# Patient Record
Sex: FEMALE | Race: BLACK OR AFRICAN AMERICAN | URBAN - METROPOLITAN AREA
[De-identification: names, ages, dates, MRNs, and addresses within clinical notes are randomized per-mention and may not be internally consistent; named-entity substitution may affect disease eponyms.]

---

## 2023-07-05 ENCOUNTER — OFFICE VISIT (OUTPATIENT)
Dept: OBGYN | Age: 28
End: 2023-07-05
Payer: COMMERCIAL

## 2023-07-05 ENCOUNTER — HOSPITAL ENCOUNTER (OUTPATIENT)
Age: 28
Setting detail: SPECIMEN
Discharge: HOME | End: 2023-07-05
Payer: COMMERCIAL

## 2023-07-05 VITALS
HEIGHT: 65 IN | WEIGHT: 192 LBS | BODY MASS INDEX: 31.99 KG/M2 | DIASTOLIC BLOOD PRESSURE: 73 MMHG | SYSTOLIC BLOOD PRESSURE: 119 MMHG

## 2023-07-05 DIAGNOSIS — N91.2 AMENORRHEA: ICD-10-CM

## 2023-07-05 DIAGNOSIS — Z01.419 WOMEN'S ANNUAL ROUTINE GYNECOLOGICAL EXAMINATION: Primary | ICD-10-CM

## 2023-07-05 DIAGNOSIS — E66.9 OBESITY (BMI 30.0-34.9): ICD-10-CM

## 2023-07-05 LAB
B-HCG SERPL EIA 3RD IS-ACNC: 41.7 MIU/ML
CONTROL: NORMAL
FSH SERPL-ACNC: 0.5 MIU/ML
PREGNANCY TEST URINE, POC: POSITIVE
PROLACTIN SERPL IA-MCNC: 13 NG/ML
TSH SERPL DL<=0.005 MIU/L-ACNC: 2.39 UIU/ML (ref 0.27–4.2)

## 2023-07-05 PROCEDURE — 99385 PREV VISIT NEW AGE 18-39: CPT

## 2023-07-05 PROCEDURE — 87801 DETECT AGNT MULT DNA AMPLI: CPT

## 2023-07-05 PROCEDURE — 36415 COLL VENOUS BLD VENIPUNCTURE: CPT

## 2023-07-05 PROCEDURE — 88142 CYTOPATH C/V THIN LAYER: CPT

## 2023-07-05 PROCEDURE — 81025 URINE PREGNANCY TEST: CPT

## 2023-07-05 SDOH — ECONOMIC STABILITY: FOOD INSECURITY: WITHIN THE PAST 12 MONTHS, THE FOOD YOU BOUGHT JUST DIDN'T LAST AND YOU DIDN'T HAVE MONEY TO GET MORE.: NEVER TRUE

## 2023-07-05 SDOH — ECONOMIC STABILITY: FOOD INSECURITY: WITHIN THE PAST 12 MONTHS, YOU WORRIED THAT YOUR FOOD WOULD RUN OUT BEFORE YOU GOT MONEY TO BUY MORE.: NEVER TRUE

## 2023-07-05 SDOH — ECONOMIC STABILITY: INCOME INSECURITY: HOW HARD IS IT FOR YOU TO PAY FOR THE VERY BASICS LIKE FOOD, HOUSING, MEDICAL CARE, AND HEATING?: NOT HARD AT ALL

## 2023-07-05 SDOH — ECONOMIC STABILITY: HOUSING INSECURITY
IN THE LAST 12 MONTHS, WAS THERE A TIME WHEN YOU DID NOT HAVE A STEADY PLACE TO SLEEP OR SLEPT IN A SHELTER (INCLUDING NOW)?: NO

## 2023-07-05 ASSESSMENT — PATIENT HEALTH QUESTIONNAIRE - PHQ9
SUM OF ALL RESPONSES TO PHQ QUESTIONS 1-9: 0
SUM OF ALL RESPONSES TO PHQ9 QUESTIONS 1 & 2: 0
SUM OF ALL RESPONSES TO PHQ QUESTIONS 1-9: 0
1. LITTLE INTEREST OR PLEASURE IN DOING THINGS: 0
2. FEELING DOWN, DEPRESSED OR HOPELESS: 0

## 2023-07-05 ASSESSMENT — ENCOUNTER SYMPTOMS
GASTROINTESTINAL NEGATIVE: 1
RESPIRATORY NEGATIVE: 1
ABDOMINAL PAIN: 0

## 2023-07-05 NOTE — PROGRESS NOTES
23    CynPolly Oniel  1995    Chief Complaint   Patient presents with    New Patient     Amenorrhea, LMP 3/3/2023, pregnancy test negative, is sexually active, never had pap smear. Pt denies any pelvic pain. The patient is a 32 y.o. female, Eliu Gallery who presents for her annual exam.  She is menstruating abnormally. She is  sexually active. She is not currently taking birth control    She reports additional symptoms of amenorrhea since 2023 . Pt states two menses prior to 2023 were abnormally heavy for her, has had no cramping or bleeding since then. She states she has taken home pregnancy tests that have been negative. Pt reports typically regular menses. Pap smear history: She has not had a PAP smear in the past.    Past Medical History:   Diagnosis Date    Irregular menses        Past Surgical History:   Procedure Laterality Date     SECTION         No family history on file. Social History     Tobacco Use    Smoking status: Never    Smokeless tobacco: Never   Vaping Use    Vaping Use: Never used   Substance Use Topics    Alcohol use: Never    Drug use: Never       No current outpatient medications on file. No current facility-administered medications for this visit. No Known Allergies          There is no immunization history on file for this patient. Review of Systems   Constitutional: Negative. Negative for fatigue and fever. Respiratory: Negative. Gastrointestinal: Negative. Negative for abdominal pain. Endocrine: Negative. Genitourinary:  Positive for menstrual problem. Negative for dysuria, pelvic pain, vaginal bleeding, vaginal discharge and vaginal pain. Musculoskeletal: Negative. Skin: Negative. Neurological: Negative. Negative for dizziness and headaches. Psychiatric/Behavioral: Negative.        /73 (Site: Left Upper Arm, Position: Sitting, Cuff Size: Large Adult)   Ht 5' 5\" (1.651 m)   Wt 192 lb (87.1 kg)

## 2023-07-07 LAB
SHBG SERPL-SCNC: 52 NMOL/L (ref 30–135)
TESTOST FREE SERPL-MCNC: 11.3 PG/ML (ref 0.8–7.4)
TESTOST SERPL-MCNC: 83 NG/DL (ref 20–70)

## 2023-07-08 LAB
C TRACH RRNA SPEC QL NAA+PROBE: NEGATIVE
N GONORRHOEA RRNA SPEC QL NAA+PROBE: NEGATIVE

## 2023-07-13 DIAGNOSIS — N94.89 OTHER SPECIFIED CONDITIONS ASSOCIATED WITH FEMALE GENITAL ORGANS AND MENSTRUAL CYCLE: Primary | ICD-10-CM

## 2023-07-19 ENCOUNTER — OFFICE VISIT (OUTPATIENT)
Dept: OBGYN | Age: 28
End: 2023-07-19
Payer: COMMERCIAL

## 2023-07-19 VITALS
DIASTOLIC BLOOD PRESSURE: 74 MMHG | WEIGHT: 192 LBS | SYSTOLIC BLOOD PRESSURE: 113 MMHG | HEIGHT: 65 IN | BODY MASS INDEX: 31.99 KG/M2

## 2023-07-19 DIAGNOSIS — O20.0 THREATENED ABORTION: Primary | ICD-10-CM

## 2023-07-19 DIAGNOSIS — N94.89 OTHER SPECIFIED CONDITIONS ASSOCIATED WITH FEMALE GENITAL ORGANS AND MENSTRUAL CYCLE: ICD-10-CM

## 2023-07-19 PROCEDURE — 99213 OFFICE O/P EST LOW 20 MIN: CPT | Performed by: OBSTETRICS & GYNECOLOGY

## 2023-07-19 PROCEDURE — 36415 COLL VENOUS BLD VENIPUNCTURE: CPT | Performed by: OBSTETRICS & GYNECOLOGY

## 2023-07-20 ENCOUNTER — OFFICE VISIT (OUTPATIENT)
Dept: OBGYN | Age: 28
End: 2023-07-20
Payer: COMMERCIAL

## 2023-07-20 VITALS
BODY MASS INDEX: 31.99 KG/M2 | HEIGHT: 65 IN | SYSTOLIC BLOOD PRESSURE: 143 MMHG | DIASTOLIC BLOOD PRESSURE: 84 MMHG | WEIGHT: 192 LBS

## 2023-07-20 DIAGNOSIS — O20.0 THREATENED ABORTION: Primary | ICD-10-CM

## 2023-07-20 LAB
B-HCG SERPL EIA 3RD IS-ACNC: 668.9 MIU/ML
DEPRECATED RDW RBC AUTO: 13.8 % (ref 12.4–15.4)
HCT VFR BLD AUTO: 37.2 % (ref 36–48)
HGB BLD-MCNC: 12.6 G/DL (ref 12–16)
MCH RBC QN AUTO: 29.8 PG (ref 26–34)
MCHC RBC AUTO-ENTMCNC: 33.8 G/DL (ref 31–36)
MCV RBC AUTO: 88.3 FL (ref 80–100)
PLATELET # BLD AUTO: 271 K/UL (ref 135–450)
PMV BLD AUTO: 8.9 FL (ref 5–10.5)
RBC # BLD AUTO: 4.21 M/UL (ref 4–5.2)
WBC # BLD AUTO: 6 K/UL (ref 4–11)

## 2023-07-20 PROCEDURE — 99214 OFFICE O/P EST MOD 30 MIN: CPT | Performed by: OBSTETRICS & GYNECOLOGY

## 2023-07-20 RX ORDER — CHOLECALCIFEROL (VITAMIN D3) 25 MCG
1 TABLET,CHEWABLE ORAL DAILY
Qty: 90 CAPSULE | Refills: 3 | Status: SHIPPED | OUTPATIENT
Start: 2023-07-20

## 2023-07-23 ENCOUNTER — APPOINTMENT (OUTPATIENT)
Dept: ULTRASOUND IMAGING | Age: 28
End: 2023-07-23
Payer: COMMERCIAL

## 2023-07-23 ENCOUNTER — HOSPITAL ENCOUNTER (OUTPATIENT)
Age: 28
Discharge: HOME | End: 2023-07-23
Attending: OBSTETRICS & GYNECOLOGY | Admitting: OBSTETRICS & GYNECOLOGY
Payer: COMMERCIAL

## 2023-07-23 VITALS
OXYGEN SATURATION: 100 % | RESPIRATION RATE: 17 BRPM | DIASTOLIC BLOOD PRESSURE: 59 MMHG | HEART RATE: 73 BPM | TEMPERATURE: 99 F | SYSTOLIC BLOOD PRESSURE: 105 MMHG

## 2023-07-23 LAB — GONADOTROPIN, CHORIONIC (HCG) QUANT: 369.8 UIU/ML

## 2023-07-23 PROCEDURE — 84702 CHORIONIC GONADOTROPIN TEST: CPT

## 2023-07-23 PROCEDURE — 93975 VASCULAR STUDY: CPT

## 2023-07-23 PROCEDURE — 99214 OFFICE O/P EST MOD 30 MIN: CPT

## 2023-07-23 PROCEDURE — 99213 OFFICE O/P EST LOW 20 MIN: CPT

## 2023-07-23 PROCEDURE — 76817 TRANSVAGINAL US OBSTETRIC: CPT

## 2023-07-23 NOTE — FLOWSHEET NOTE
Pt states she has been bleeding for about 1 week, today the bleeding has increased (using 2-3 pads a day) and has lower abdominal cramping. Pt states she had an U/S a couple days ago (07/19) and was told she was about 1 month pregnant. Pt states her LMP was May 3. Blood drawn for HCG, sent to lab.

## 2023-07-23 NOTE — FLOWSHEET NOTE
Pt to labor and delivery via wheelchair, pt to triage 4, pt to bathroom to obtain urine spec and change into gown.

## 2023-07-25 ENCOUNTER — OFFICE VISIT (OUTPATIENT)
Dept: OBGYN | Age: 28
End: 2023-07-25
Payer: COMMERCIAL

## 2023-07-25 VITALS
BODY MASS INDEX: 31.65 KG/M2 | WEIGHT: 190 LBS | SYSTOLIC BLOOD PRESSURE: 125 MMHG | DIASTOLIC BLOOD PRESSURE: 79 MMHG | HEIGHT: 65 IN

## 2023-07-25 DIAGNOSIS — O20.0 THREATENED ABORTION: Primary | ICD-10-CM

## 2023-07-25 PROCEDURE — 99213 OFFICE O/P EST LOW 20 MIN: CPT | Performed by: OBSTETRICS & GYNECOLOGY

## 2023-07-25 PROCEDURE — 36415 COLL VENOUS BLD VENIPUNCTURE: CPT | Performed by: OBSTETRICS & GYNECOLOGY

## 2023-07-25 NOTE — PROGRESS NOTES
23    CynPollymaxine Engle  1995    Chief Complaint   Patient presents with    Follow-up     Pt here for mab follow up, ultrasound today, pt having heavy bleeding bright red -dark. Denies cramping and pain. Opal Hendrix is a 32 y.o. female who presents today for evaluation of thretened     Past Medical History:   Diagnosis Date    Irregular menses        Past Surgical History:   Procedure Laterality Date     SECTION         Social History     Tobacco Use    Smoking status: Never    Smokeless tobacco: Never   Vaping Use    Vaping Use: Never used   Substance Use Topics    Alcohol use: Never    Drug use: Never       No family history on file. Current Outpatient Medications   Medication Sig Dispense Refill    Prenatal Vit-Fe Sulfate-FA-DHA (PRENATAL VITAMIN/MIN +DHA) 27-0.8-200 MG CAPS Take 1 tablet by mouth daily (may substitute any prenatal vitamin covered by insurance, ok for prenatal without DHA if DHA based prenatal is not covered) 90 capsule 3     No current facility-administered medications for this visit.        No Known Allergies        2023 1310 2023 1341 hCG, quantitative, pregnancy [[id number]]   Blood    Component Value Units   hCG Quant 369.8  uIU/ML          2023 1510 2023 0044 HCG, Quantitative, Pregnancy [[id number]]   Blood    Component Value Units   hCG Quant 668.9  mIU/mL          2023 1510 2023 0021 CBC [[id number]]   Blood    Component Value Units   WBC 6.0 K/uL   RBC 4.21 M/uL   Hemoglobin 12.6 g/dL   Hematocrit 37.2 %   MCV 88.3 fL   MCH 29.8 pg   MCHC 33.8 g/dL   RDW 13.8 %   Platelets 782 K/uL   MPV 8.9 fL          2023 1359 2023 2037 HCG, Quantitative, Pregnancy [[id number]]   Blood    Component Value Units   hCG Quant 41.7  mIU/mL              Review of Systems    /79 (Site: Right Upper Arm, Position: Sitting, Cuff Size: Large Adult)   Ht 5' 5\" (1.651 m)   Wt 190 lb (86.2 kg)   LMP 2023

## 2023-07-26 ENCOUNTER — HOSPITAL ENCOUNTER (OUTPATIENT)
Age: 28
Discharge: HOME | End: 2023-07-26
Attending: OBSTETRICS & GYNECOLOGY | Admitting: OBSTETRICS & GYNECOLOGY
Payer: COMMERCIAL

## 2023-07-26 VITALS
TEMPERATURE: 96.9 F | SYSTOLIC BLOOD PRESSURE: 124 MMHG | HEART RATE: 68 BPM | DIASTOLIC BLOOD PRESSURE: 71 MMHG | RESPIRATION RATE: 16 BRPM | OXYGEN SATURATION: 97 %

## 2023-07-26 DIAGNOSIS — O20.0 THREATENED ABORTION: Primary | ICD-10-CM

## 2023-07-26 LAB
ABO + RH BLD: NORMAL
ALBUMIN SERPL-MCNC: 4.5 GM/DL (ref 3.4–5)
ALP BLD-CCNC: 57 IU/L (ref 40–128)
ALT SERPL-CCNC: 24 U/L (ref 10–40)
ANION GAP SERPL CALCULATED.3IONS-SCNC: 9 MMOL/L (ref 4–16)
AST SERPL-CCNC: 21 IU/L (ref 15–37)
B-HCG SERPL EIA 3RD IS-ACNC: 421.2 MIU/ML
BASOPHILS ABSOLUTE: 0 K/CU MM
BASOPHILS RELATIVE PERCENT: 0.2 % (ref 0–1)
BILIRUB SERPL-MCNC: 0.2 MG/DL (ref 0–1)
BUN SERPL-MCNC: 6 MG/DL (ref 6–23)
CALCIUM SERPL-MCNC: 9.1 MG/DL (ref 8.3–10.6)
CHLORIDE BLD-SCNC: 102 MMOL/L (ref 99–110)
CO2: 25 MMOL/L (ref 21–32)
CREAT SERPL-MCNC: 0.7 MG/DL (ref 0.6–1.1)
DEPRECATED RDW RBC AUTO: 13.8 % (ref 12.4–15.4)
DIFFERENTIAL TYPE: ABNORMAL
EOSINOPHILS ABSOLUTE: 0.1 K/CU MM
EOSINOPHILS RELATIVE PERCENT: 0.9 % (ref 0–3)
GFR SERPL CREATININE-BSD FRML MDRD: >60 ML/MIN/1.73M2
GLUCOSE SERPL-MCNC: 108 MG/DL (ref 70–99)
GONADOTROPIN, CHORIONIC (HCG) QUANT: 377.8 UIU/ML
HCT VFR BLD AUTO: 39 % (ref 36–48)
HCT VFR BLD CALC: 38.7 % (ref 37–47)
HEMOGLOBIN: 12.1 GM/DL (ref 12.5–16)
HGB BLD-MCNC: 13.3 G/DL (ref 12–16)
IMMATURE NEUTROPHIL %: 0.2 % (ref 0–0.43)
LYMPHOCYTES ABSOLUTE: 2.3 K/CU MM
LYMPHOCYTES RELATIVE PERCENT: 35.2 % (ref 24–44)
MCH RBC QN AUTO: 28.8 PG (ref 27–31)
MCH RBC QN AUTO: 30.2 PG (ref 26–34)
MCHC RBC AUTO-ENTMCNC: 31.3 % (ref 32–36)
MCHC RBC AUTO-ENTMCNC: 34 G/DL (ref 31–36)
MCV RBC AUTO: 88.8 FL (ref 80–100)
MCV RBC AUTO: 92.1 FL (ref 78–100)
MONOCYTES ABSOLUTE: 0.4 K/CU MM
MONOCYTES RELATIVE PERCENT: 6.3 % (ref 0–4)
NUCLEATED RBC %: 0 %
PDW BLD-RTO: 13 % (ref 11.7–14.9)
PLATELET # BLD AUTO: 246 K/UL (ref 135–450)
PLATELET # BLD: 244 K/CU MM (ref 140–440)
PMV BLD AUTO: 8.7 FL (ref 5–10.5)
PMV BLD AUTO: 9.9 FL (ref 7.5–11.1)
POTASSIUM SERPL-SCNC: 4.1 MMOL/L (ref 3.5–5.1)
RBC # BLD AUTO: 4.39 M/UL (ref 4–5.2)
RBC # BLD: 4.2 M/CU MM (ref 4.2–5.4)
SEGMENTED NEUTROPHILS ABSOLUTE COUNT: 3.7 K/CU MM
SEGMENTED NEUTROPHILS RELATIVE PERCENT: 57.2 % (ref 36–66)
SODIUM BLD-SCNC: 136 MMOL/L (ref 135–145)
TOTAL IMMATURE NEUTOROPHIL: 0.01 K/CU MM
TOTAL NUCLEATED RBC: 0 K/CU MM
TOTAL PROTEIN: 7.3 GM/DL (ref 6.4–8.2)
WBC # BLD AUTO: 5.2 K/UL (ref 4–11)
WBC # BLD: 6.5 K/CU MM (ref 4–10.5)

## 2023-07-26 PROCEDURE — 99203 OFFICE O/P NEW LOW 30 MIN: CPT

## 2023-07-26 PROCEDURE — 84702 CHORIONIC GONADOTROPIN TEST: CPT

## 2023-07-26 PROCEDURE — 99214 OFFICE O/P EST MOD 30 MIN: CPT | Performed by: OBSTETRICS & GYNECOLOGY

## 2023-07-26 PROCEDURE — 80053 COMPREHEN METABOLIC PANEL: CPT

## 2023-07-26 PROCEDURE — 85025 COMPLETE CBC W/AUTO DIFF WBC: CPT

## 2023-07-26 NOTE — FLOWSHEET NOTE
Pt presents amb to unit lab work. POC discussed. Interpretor services used. Pt states as still having some bleeding unable to give amount but states has changed pad 2 times today. Dr. Gibbs Keep notified of pt arrival. Orders received.

## 2023-07-27 NOTE — PROGRESS NOTES
Dean Engle  1995  Primary Care Physician: No primary care provider on file. HISTORY & PHYSICAL        HOSPITAL: [facility]    HPI: Mandeep French is a 32 y.o. female [id number] who is being followed for abnormal first trimester pregnancy who had decreasing hcgs and then her hcg increased. She reports light bleeding. She denies any pain. No diagnosis found. Patient Active Problem List   Diagnosis    Labor and delivery indication for care or intervention       OB History    Para Term  AB Living   3 2 2 0 1 2   SAB IAB Ectopic Molar Multiple Live Births   1 0 0 0 0 2      # Outcome Date GA Lbr Aniket/2nd Weight Sex Delivery Anes PTL Lv   3 Term 22    F CS-LTranv   ROBBIE   2 Term 10/03/19    M CS-LTranv   ROBBIE   1 SAB              Past Medical History:   Diagnosis Date    Irregular menses        Past Surgical History:   Procedure Laterality Date     SECTION         Social History     Socioeconomic History    Marital status: Unknown     Spouse name: Not on file    Number of children: Not on file    Years of education: Not on file    Highest education level: Not on file   Occupational History    Not on file   Tobacco Use    Smoking status: Never    Smokeless tobacco: Never   Vaping Use    Vaping Use: Never used   Substance and Sexual Activity    Alcohol use: Never    Drug use: Never    Sexual activity: Yes     Partners: Male   Other Topics Concern    Not on file   Social History Narrative    Not on file     Social Determinants of Health     Financial Resource Strain: Low Risk     Difficulty of Paying Living Expenses: Not hard at all   Food Insecurity: No Food Insecurity    Worried About Running Out of Food in the Last Year: Never true    [address] in the Last Year: Never true   Transportation Needs: Unknown    Lack of Transportation (Medical): Not on file    Lack of Transportation (Non-Medical):  No   Physical Activity: Not on file   Stress: Not on file   Social

## 2023-07-31 ENCOUNTER — HOSPITAL ENCOUNTER (OUTPATIENT)
Age: 28
Discharge: HOME | End: 2023-07-31
Payer: COMMERCIAL

## 2023-07-31 LAB
GONADOTROPIN, CHORIONIC (HCG) QUANT: 277.5 UIU/ML
HCT VFR BLD CALC: 38.9 % (ref 37–47)
HEMOGLOBIN: 12.4 GM/DL (ref 12.5–16)
MCH RBC QN AUTO: 28.8 PG (ref 27–31)
MCHC RBC AUTO-ENTMCNC: 31.9 % (ref 32–36)
MCV RBC AUTO: 90.5 FL (ref 78–100)
PDW BLD-RTO: 12.9 % (ref 11.7–14.9)
PLATELET # BLD: 254 K/CU MM (ref 140–440)
PMV BLD AUTO: 9.6 FL (ref 7.5–11.1)
RBC # BLD: 4.3 M/CU MM (ref 4.2–5.4)
WBC # BLD: 5.6 K/CU MM (ref 4–10.5)

## 2023-07-31 PROCEDURE — 36415 COLL VENOUS BLD VENIPUNCTURE: CPT

## 2023-07-31 PROCEDURE — 85027 COMPLETE CBC AUTOMATED: CPT

## 2023-07-31 PROCEDURE — 84702 CHORIONIC GONADOTROPIN TEST: CPT

## 2023-08-01 ENCOUNTER — OFFICE VISIT (OUTPATIENT)
Dept: OBGYN | Age: 28
End: 2023-08-01
Payer: COMMERCIAL

## 2023-08-01 VITALS
SYSTOLIC BLOOD PRESSURE: 111 MMHG | BODY MASS INDEX: 31.65 KG/M2 | HEIGHT: 65 IN | DIASTOLIC BLOOD PRESSURE: 72 MMHG | WEIGHT: 190 LBS

## 2023-08-01 DIAGNOSIS — O20.0 THREATENED ABORTION: Primary | ICD-10-CM

## 2023-08-01 PROCEDURE — 99213 OFFICE O/P EST LOW 20 MIN: CPT | Performed by: OBSTETRICS & GYNECOLOGY

## 2023-08-07 ENCOUNTER — NURSE ONLY (OUTPATIENT)
Dept: OBGYN | Age: 28
End: 2023-08-07
Payer: COMMERCIAL

## 2023-08-07 DIAGNOSIS — O20.0 THREATENED ABORTION: ICD-10-CM

## 2023-08-07 LAB — B-HCG SERPL EIA 3RD IS-ACNC: 256.1 MIU/ML

## 2023-08-07 PROCEDURE — 36415 COLL VENOUS BLD VENIPUNCTURE: CPT | Performed by: OBSTETRICS & GYNECOLOGY

## 2023-08-09 DIAGNOSIS — O20.0 THREATENED ABORTION: Primary | ICD-10-CM

## 2023-08-10 ENCOUNTER — HOSPITAL ENCOUNTER (OUTPATIENT)
Age: 28
Discharge: HOME | End: 2023-08-10
Attending: OBSTETRICS & GYNECOLOGY | Admitting: OBSTETRICS & GYNECOLOGY
Payer: COMMERCIAL

## 2023-08-10 LAB
ALBUMIN SERPL-MCNC: 4.5 GM/DL (ref 3.4–5)
ALP BLD-CCNC: 58 IU/L (ref 40–129)
ALT SERPL-CCNC: 23 U/L (ref 10–40)
ANION GAP SERPL CALCULATED.3IONS-SCNC: 11 MMOL/L (ref 4–16)
AST SERPL-CCNC: 21 IU/L (ref 15–37)
BASOPHILS ABSOLUTE: 0 K/CU MM
BASOPHILS RELATIVE PERCENT: 0 % (ref 0–1)
BILIRUB SERPL-MCNC: 0.1 MG/DL (ref 0–1)
BUN SERPL-MCNC: 8 MG/DL (ref 6–23)
CALCIUM SERPL-MCNC: 9.2 MG/DL (ref 8.3–10.6)
CHLORIDE BLD-SCNC: 103 MMOL/L (ref 99–110)
CO2: 24 MMOL/L (ref 21–32)
CREAT SERPL-MCNC: 0.7 MG/DL (ref 0.6–1.1)
DIFFERENTIAL TYPE: ABNORMAL
EOSINOPHILS ABSOLUTE: 0.1 K/CU MM
EOSINOPHILS RELATIVE PERCENT: 1.2 % (ref 0–3)
GFR SERPL CREATININE-BSD FRML MDRD: >60 ML/MIN/1.73M2
GLUCOSE SERPL-MCNC: 91 MG/DL (ref 70–99)
GONADOTROPIN, CHORIONIC (HCG) QUANT: 145.6 UIU/ML
HCT VFR BLD CALC: 37.9 % (ref 37–47)
HEMOGLOBIN: 11.9 GM/DL (ref 12.5–16)
IMMATURE NEUTROPHIL %: 0.4 % (ref 0–0.43)
LYMPHOCYTES ABSOLUTE: 2.3 K/CU MM
LYMPHOCYTES RELATIVE PERCENT: 39.6 % (ref 24–44)
MCH RBC QN AUTO: 28.9 PG (ref 27–31)
MCHC RBC AUTO-ENTMCNC: 31.4 % (ref 32–36)
MCV RBC AUTO: 92 FL (ref 78–100)
MONOCYTES ABSOLUTE: 0.4 K/CU MM
MONOCYTES RELATIVE PERCENT: 6.2 % (ref 0–4)
NUCLEATED RBC %: 0 %
PDW BLD-RTO: 12.7 % (ref 11.7–14.9)
PLATELET # BLD: 237 K/CU MM (ref 140–440)
PMV BLD AUTO: 10 FL (ref 7.5–11.1)
POTASSIUM SERPL-SCNC: 4.3 MMOL/L (ref 3.5–5.1)
RBC # BLD: 4.12 M/CU MM (ref 4.2–5.4)
SEGMENTED NEUTROPHILS ABSOLUTE COUNT: 3 K/CU MM
SEGMENTED NEUTROPHILS RELATIVE PERCENT: 52.6 % (ref 36–66)
SODIUM BLD-SCNC: 138 MMOL/L (ref 135–145)
TOTAL IMMATURE NEUTOROPHIL: 0.02 K/CU MM
TOTAL NUCLEATED RBC: 0 K/CU MM
TOTAL PROTEIN: 6.8 GM/DL (ref 6.4–8.2)
WBC # BLD: 5.7 K/CU MM (ref 4–10.5)

## 2023-08-10 PROCEDURE — 80053 COMPREHEN METABOLIC PANEL: CPT

## 2023-08-10 PROCEDURE — 85025 COMPLETE CBC W/AUTO DIFF WBC: CPT

## 2023-08-10 PROCEDURE — 84702 CHORIONIC GONADOTROPIN TEST: CPT

## 2023-08-10 PROCEDURE — 99211 OFF/OP EST MAY X REQ PHY/QHP: CPT

## 2023-08-10 NOTE — FLOWSHEET NOTE
Patient here for repeat hcg. Labs drawn without issue. Patient signs discharge instructions and ambulates off unit without signs of distress.

## 2023-08-15 ENCOUNTER — OFFICE VISIT (OUTPATIENT)
Dept: OBGYN | Age: 28
End: 2023-08-15
Payer: COMMERCIAL

## 2023-08-15 VITALS
DIASTOLIC BLOOD PRESSURE: 70 MMHG | BODY MASS INDEX: 31.99 KG/M2 | WEIGHT: 192 LBS | SYSTOLIC BLOOD PRESSURE: 116 MMHG | HEIGHT: 65 IN

## 2023-08-15 DIAGNOSIS — O20.0 THREATENED ABORTION: Primary | ICD-10-CM

## 2023-08-15 LAB — B-HCG SERPL EIA 3RD IS-ACNC: 12.2 MIU/ML

## 2023-08-15 PROCEDURE — 99213 OFFICE O/P EST LOW 20 MIN: CPT | Performed by: OBSTETRICS & GYNECOLOGY

## 2023-08-15 PROCEDURE — 36415 COLL VENOUS BLD VENIPUNCTURE: CPT | Performed by: OBSTETRICS & GYNECOLOGY

## 2024-01-20 ENCOUNTER — HOSPITAL ENCOUNTER (EMERGENCY)
Age: 29
Discharge: HOME | End: 2024-01-20
Payer: COMMERCIAL

## 2024-01-20 VITALS
SYSTOLIC BLOOD PRESSURE: 135 MMHG | DIASTOLIC BLOOD PRESSURE: 87 MMHG | OXYGEN SATURATION: 100 % | HEART RATE: 93 BPM | TEMPERATURE: 98.6 F | RESPIRATION RATE: 16 BRPM

## 2024-01-20 DIAGNOSIS — K02.9 DENTAL DECAY: ICD-10-CM

## 2024-01-20 DIAGNOSIS — K08.89 PAIN, DENTAL: Primary | ICD-10-CM

## 2024-01-20 PROCEDURE — 6370000000 HC RX 637 (ALT 250 FOR IP): Performed by: NURSE PRACTITIONER

## 2024-01-20 PROCEDURE — 99283 EMERGENCY DEPT VISIT LOW MDM: CPT

## 2024-01-20 RX ORDER — ACETAMINOPHEN 325 MG/1
650 TABLET ORAL EVERY 6 HOURS PRN
Qty: 40 TABLET | Refills: 0 | Status: SHIPPED | OUTPATIENT
Start: 2024-01-20

## 2024-01-20 RX ORDER — PENICILLIN V POTASSIUM 500 MG/1
500 TABLET ORAL 4 TIMES DAILY
Qty: 40 TABLET | Refills: 0 | Status: SHIPPED | OUTPATIENT
Start: 2024-01-20 | End: 2024-01-30

## 2024-01-20 RX ORDER — PENICILLIN V POTASSIUM 500 MG/1
500 TABLET ORAL ONCE
Status: COMPLETED | OUTPATIENT
Start: 2024-01-20 | End: 2024-01-20

## 2024-01-20 RX ORDER — ACETAMINOPHEN 325 MG/1
650 TABLET ORAL ONCE
Status: COMPLETED | OUTPATIENT
Start: 2024-01-20 | End: 2024-01-20

## 2024-01-20 RX ADMIN — ACETAMINOPHEN 650 MG: 325 TABLET ORAL at 16:25

## 2024-01-20 RX ADMIN — PENICILLIN V POTASSIUM 500 MG: 500 TABLET, FILM COATED ORAL at 16:25

## 2024-01-20 ASSESSMENT — PAIN DESCRIPTION - LOCATION: LOCATION: MOUTH

## 2024-01-20 ASSESSMENT — ENCOUNTER SYMPTOMS
CHEST TIGHTNESS: 0
FACIAL SWELLING: 0
TROUBLE SWALLOWING: 0
SORE THROAT: 1
VOICE CHANGE: 0
SHORTNESS OF BREATH: 0

## 2024-01-20 ASSESSMENT — PAIN SCALES - GENERAL: PAINLEVEL_OUTOF10: 10

## 2024-01-20 NOTE — ED PROVIDER NOTES
[facility] EMERGENCY DEPARTMENT  EMERGENCY DEPARTMENT ENCOUNTER      Pt Name: Dean Engle  MRN: [id number]  Birthdate 1995  Date of evaluation: 2024  Provider: TATUM Hilliard - CNP  PCP: No primary care provider on file.  Note Started: 3:49 PM EST 24    I am the Primary Clinician of Record.  ANDER. I have evaluated this patient.    CHIEF COMPLAINT       Chief Complaint   Patient presents with    Dental Pain       HISTORY OF PRESENT ILLNESS: 1 or more Elements   Dean Egnle is a 28 y.o. female who presents to the ER with chief complaint of who presents with dental pain. Patient states that the pain began 3 days ago. This has been intermittent for several years. It is localized to the left upper jaw. It is aggravated by palpation and chewing, alleviated by nothing, with no radiation or migration. Denies any swelling, drainage, fevers, chills. Patient does not have a dentist.   She reports to be 4 months pregnant.    Information obtained from patient via professional interpretation services as the patient is non-english speaking     I have reviewed the nursing triage documentation and agree unless otherwise noted.  REVIEW OF SYSTEMS :    Review of Systems   Constitutional:  Negative for fatigue and fever.   HENT:  Positive for dental problem and sore throat. Negative for facial swelling, trouble swallowing and voice change.    Respiratory:  Negative for chest tightness and shortness of breath.    Cardiovascular:  Negative for chest pain.     Positives and Pertinent negatives as per HPI.   SURGICAL HISTORY     Past Surgical History:   Procedure Laterality Date     SECTION         CURRENTMEDICATIONS       Previous Medications    PRENATAL VIT-FE SULFATE-FA-DHA (PRENATAL VITAMIN/MIN +DHA) 27-0.8-200 MG CAPS    Take 1 tablet by mouth daily (may substitute any prenatal vitamin covered by insurance, ok for prenatal without DHA if DHA

## 2024-01-28 ENCOUNTER — HOSPITAL ENCOUNTER (EMERGENCY)
Age: 29
Discharge: HOME | End: 2024-01-29
Attending: EMERGENCY MEDICINE
Payer: COMMERCIAL

## 2024-01-28 ENCOUNTER — APPOINTMENT (OUTPATIENT)
Dept: GENERAL RADIOLOGY | Age: 29
End: 2024-01-28
Payer: COMMERCIAL

## 2024-01-28 ENCOUNTER — APPOINTMENT (OUTPATIENT)
Dept: GENERAL RADIOLOGY | Age: 29
End: 2024-01-28
Attending: EMERGENCY MEDICINE
Payer: COMMERCIAL

## 2024-01-28 DIAGNOSIS — R06.02 SHORTNESS OF BREATH: Primary | ICD-10-CM

## 2024-01-28 DIAGNOSIS — Z3A.16 16 WEEKS GESTATION OF PREGNANCY: ICD-10-CM

## 2024-01-28 LAB
ALBUMIN SERPL-MCNC: 4.2 GM/DL (ref 3.4–5)
ALP BLD-CCNC: 70 IU/L (ref 40–129)
ALT SERPL-CCNC: 30 U/L (ref 10–40)
ANION GAP SERPL CALCULATED.3IONS-SCNC: 12 MMOL/L (ref 7–16)
AST SERPL-CCNC: 36 IU/L (ref 15–37)
BASOPHILS ABSOLUTE: 0 K/CU MM
BASOPHILS RELATIVE PERCENT: 0.2 % (ref 0–1)
BILIRUB SERPL-MCNC: 0.1 MG/DL (ref 0–1)
BUN SERPL-MCNC: 6 MG/DL (ref 6–23)
CALCIUM SERPL-MCNC: 8.8 MG/DL (ref 8.3–10.6)
CHLORIDE BLD-SCNC: 101 MMOL/L (ref 99–110)
CO2: 21 MMOL/L (ref 21–32)
CREAT SERPL-MCNC: 0.5 MG/DL (ref 0.6–1.1)
DIFFERENTIAL TYPE: ABNORMAL
EOSINOPHILS ABSOLUTE: 0.1 K/CU MM
EOSINOPHILS RELATIVE PERCENT: 0.9 % (ref 0–3)
GFR SERPL CREATININE-BSD FRML MDRD: >60 ML/MIN/1.73M2
GLUCOSE SERPL-MCNC: 102 MG/DL (ref 70–99)
HCG QUALITATIVE: POSITIVE
HCT VFR BLD CALC: 43.1 % (ref 37–47)
HEMOGLOBIN: 14.3 GM/DL (ref 12.5–16)
IMMATURE NEUTROPHIL %: 0.2 % (ref 0–0.43)
INFLUENZA A ANTIGEN: NOT DETECTED
INFLUENZA B ANTIGEN: NOT DETECTED
LYMPHOCYTES ABSOLUTE: 2.2 K/CU MM
LYMPHOCYTES RELATIVE PERCENT: 32.9 % (ref 24–44)
MAGNESIUM: 2.5 MG/DL (ref 1.8–2.4)
MCH RBC QN AUTO: 29.6 PG (ref 27–31)
MCHC RBC AUTO-ENTMCNC: 33.2 % (ref 32–36)
MCV RBC AUTO: 89.2 FL (ref 78–100)
MONOCYTES ABSOLUTE: 0.3 K/CU MM
MONOCYTES RELATIVE PERCENT: 4.9 % (ref 0–4)
NUCLEATED RBC %: 0 %
PDW BLD-RTO: 12.5 % (ref 11.7–14.9)
PLATELET # BLD: 234 K/CU MM (ref 140–440)
PMV BLD AUTO: 10.1 FL (ref 7.5–11.1)
POTASSIUM SERPL-SCNC: 3.9 MMOL/L (ref 3.5–5.1)
PRO-BNP: <36 PG/ML
RBC # BLD: 4.83 M/CU MM (ref 4.2–5.4)
SARS-COV-2 RDRP RESP QL NAA+PROBE: NOT DETECTED
SEGMENTED NEUTROPHILS ABSOLUTE COUNT: 4 K/CU MM
SEGMENTED NEUTROPHILS RELATIVE PERCENT: 60.9 % (ref 36–66)
SODIUM BLD-SCNC: 134 MMOL/L (ref 135–145)
SOURCE: NORMAL
TOTAL IMMATURE NEUTOROPHIL: 0.01 K/CU MM
TOTAL NUCLEATED RBC: 0 K/CU MM
TOTAL PROTEIN: 7.6 GM/DL (ref 6.4–8.2)
TROPONIN, HIGH SENSITIVITY: <6 NG/L (ref 0–14)
WBC # BLD: 6.5 K/CU MM (ref 4–10.5)

## 2024-01-28 PROCEDURE — 83880 ASSAY OF NATRIURETIC PEPTIDE: CPT

## 2024-01-28 PROCEDURE — 84484 ASSAY OF TROPONIN QUANT: CPT

## 2024-01-28 PROCEDURE — 87635 SARS-COV-2 COVID-19 AMP PRB: CPT

## 2024-01-28 PROCEDURE — 71045 X-RAY EXAM CHEST 1 VIEW: CPT

## 2024-01-28 PROCEDURE — 83735 ASSAY OF MAGNESIUM: CPT

## 2024-01-28 PROCEDURE — 85379 FIBRIN DEGRADATION QUANT: CPT

## 2024-01-28 PROCEDURE — 87502 INFLUENZA DNA AMP PROBE: CPT

## 2024-01-28 PROCEDURE — 99285 EMERGENCY DEPT VISIT HI MDM: CPT

## 2024-01-28 PROCEDURE — 93005 ELECTROCARDIOGRAM TRACING: CPT | Performed by: EMERGENCY MEDICINE

## 2024-01-28 PROCEDURE — 84703 CHORIONIC GONADOTROPIN ASSAY: CPT

## 2024-01-28 PROCEDURE — 80053 COMPREHEN METABOLIC PANEL: CPT

## 2024-01-28 PROCEDURE — 85025 COMPLETE CBC W/AUTO DIFF WBC: CPT

## 2024-01-29 ENCOUNTER — APPOINTMENT (OUTPATIENT)
Dept: CT IMAGING | Age: 29
End: 2024-01-29
Payer: COMMERCIAL

## 2024-01-29 VITALS
TEMPERATURE: 98.2 F | HEART RATE: 72 BPM | DIASTOLIC BLOOD PRESSURE: 78 MMHG | SYSTOLIC BLOOD PRESSURE: 111 MMHG | OXYGEN SATURATION: 99 % | RESPIRATION RATE: 26 BRPM

## 2024-01-29 LAB
D DIMER: 3.67 UG/ML (FEU)
EKG ATRIAL RATE: 77 BPM
EKG DIAGNOSIS: NORMAL
EKG P AXIS: 26 DEGREES
EKG P-R INTERVAL: 136 MS
EKG Q-T INTERVAL: 382 MS
EKG QRS DURATION: 76 MS
EKG QTC CALCULATION (BAZETT): 432 MS
EKG R AXIS: 8 DEGREES
EKG T AXIS: 10 DEGREES
EKG VENTRICULAR RATE: 77 BPM

## 2024-01-29 PROCEDURE — 71275 CT ANGIOGRAPHY CHEST: CPT

## 2024-01-29 PROCEDURE — 6360000004 HC RX CONTRAST MEDICATION: Performed by: STUDENT IN AN ORGANIZED HEALTH CARE EDUCATION/TRAINING PROGRAM

## 2024-01-29 PROCEDURE — 93010 ELECTROCARDIOGRAM REPORT: CPT | Performed by: INTERNAL MEDICINE

## 2024-01-29 RX ADMIN — IOPAMIDOL 75 ML: 755 INJECTION, SOLUTION INTRAVENOUS at 01:21

## 2024-01-29 NOTE — ED PROVIDER NOTES
Emergency Department Encounter  Location: [facility] EMERGENCY DEPARTMENT    Patient: Dean Engle  MRN: [id number]  : 1995  Date of evaluation: 2024  ED Provider: Edwin Posadas, DO    :1a.m.  Dean Engle was checked out to me by Frank. Please see his/her initial documentation for details of the patient's initial ED presentation, physical exam and completed studies.    In brief, Dean Engle is a 28 y.o. female that presented to the emergency department shortness of breath.  Currently pregnant.    I have reviewed and interpreted all of the currently available lab results and diagnostics from this visit:  Results for orders placed or performed during the hospital encounter of 24   COVID-19, Rapid    Specimen: Nasopharyngeal   Result Value Ref Range    Source UNKNOWN     SARS-CoV-2, NAAT NOT DETECTED NOT DETECTED   Influenza A/B, Molecular    Specimen: Nasal   Result Value Ref Range    Influenza A Antigen NOT DETECTED NOT DETECTED    Influenza B Antigen NOT DETECTED NOT DETECTED   CBC with Auto Differential   Result Value Ref Range    WBC 6.5 4.0 - 10.5 K/CU MM    RBC 4.83 4.2 - 5.4 M/CU MM    Hemoglobin 14.3 12.5 - 16.0 GM/DL    Hematocrit 43.1 37 - 47 %    MCV 89.2 78 - 100 FL    MCH 29.6 27 - 31 PG    MCHC 33.2 32.0 - 36.0 %    RDW 12.5 11.7 - 14.9 %    Platelets 234 140 - 440 K/CU MM    MPV 10.1 7.5 - 11.1 FL    Differential Type AUTOMATED DIFFERENTIAL     Segs Relative 60.9 36 - 66 %    Lymphocytes % 32.9 24 - 44 %    Monocytes % 4.9 (H) 0 - 4 %    Eosinophils % 0.9 0 - 3 %    Basophils % 0.2 0 - 1 %    Segs Absolute 4.0 K/CU MM    Lymphocytes Absolute 2.2 K/CU MM    Monocytes Absolute 0.3 K/CU MM    Eosinophils Absolute 0.1 K/CU MM    Basophils Absolute 0.0 K/CU MM    Nucleated RBC % 0.0 %    Total Nucleated RBC 0.0 K/CU MM    Total Immature Neutrophil 0.01 K/CU MM    Immature Neutrophil % 0.2 0 - 0.43 %   Comprehensive Metabolic

## 2024-01-29 NOTE — DISCHARGE INSTRUCTIONS
Return the emergency department for any worsening shortness of breath, passing out, chest pain, cough, fevers or any other new worsening or concerning complaints.

## 2024-01-29 NOTE — ED PROVIDER NOTES
Emergency Department Encounter    Patient: Dean Engle  MRN: [id number]  : 1995  Date of Evaluation: 2024  ED Provider:  Megan Marie DO    Triage Chief Complaint:   Shortness of Breath    [place]:  Dean Engle is a 28 y.o. female with no chronic medical illnesses that presents to the emergency department for evaluation of shortness of breath.  Patient  at 16 weeks gestation.  Patient Andorran Creole speaking and  used throughout ED course.  Patient states she has appointment with OB/GYN in 3 days at 2 PM Dr. Rodriguez.  She also sees [facility] pregnancy [facility] as well.  Patient states short of breath since yesterday worse with movement and activity.  She states no cough or chest pain or abdominal pain denies any nausea vomiting diarrhea no sore throat runny nose earache.  Patient states no history of asthma COPD or emphysema.  Patient here for evaluation.    ROS - see HPI, below listed is current ROS at time of my eval:  General:  No fevers, no chills, no weakness  Eyes:  No recent vison changes, no discharge  ENT:  No sore throat, no nasal congestion, no hearing changes  Cardiovascular:  No chest pain, no palpitations  Respiratory: Positive for [facility] cardiac shortness of breath, no cough, no wheezing  Gastrointestinal:  No pain, no nausea, no vomiting, no diarrhea  Musculoskeletal:  No muscle pain, no joint pain  Skin:  No rash, no pruritis, no easy bruising  Neurologic:  No speech problems, no headache, no extremity numbness, no extremity tingling, no extremity weakness  Psychiatric:  No anxiety  Genitourinary:  No dysuria, no hematuria  Endocrine:  No unexpected weight gain, no unexpected weight loss  Extremities:  no edema, no pain    Past Medical History:   Diagnosis Date    Irregular menses      Past Surgical History:   Procedure Laterality Date     SECTION       No family history on file.  Social History

## 2024-05-13 DIAGNOSIS — O44.03 PLACENTA PREVIA WITHOUT HEMORRHAGE IN THIRD TRIMESTER: Primary | ICD-10-CM

## 2024-05-13 DIAGNOSIS — N94.89 OTHER SPECIFIED CONDITIONS ASSOCIATED WITH FEMALE GENITAL ORGANS AND MENSTRUAL CYCLE: ICD-10-CM

## 2024-05-13 DIAGNOSIS — O43.103 MALFORMATION OF PLACENTA IN THIRD TRIMESTER: ICD-10-CM

## 2024-05-13 DIAGNOSIS — Z03.74 FETAL GROWTH PROBLEM SUSPECTED BUT NOT FOUND: ICD-10-CM

## 2024-05-15 DIAGNOSIS — O43.103: ICD-10-CM

## 2024-05-15 DIAGNOSIS — O44.13 PLACENTA PREVIA WITH HEMORRHAGE IN THIRD TRIMESTER: Primary | ICD-10-CM

## 2024-05-30 ENCOUNTER — HOSPITAL ENCOUNTER (INPATIENT)
Age: 29
LOS: 1 days | Discharge: ANOTHER HOSPITAL | End: 2024-05-30
Attending: OBSTETRICS & GYNECOLOGY | Admitting: STUDENT IN AN ORGANIZED HEALTH CARE EDUCATION/TRAINING PROGRAM
Payer: COMMERCIAL

## 2024-05-30 VITALS
HEIGHT: 64 IN | BODY MASS INDEX: 37.56 KG/M2 | RESPIRATION RATE: 18 BRPM | SYSTOLIC BLOOD PRESSURE: 135 MMHG | OXYGEN SATURATION: 99 % | TEMPERATURE: 98.3 F | HEART RATE: 76 BPM | DIASTOLIC BLOOD PRESSURE: 85 MMHG | WEIGHT: 220 LBS

## 2024-05-30 PROBLEM — O44.03 PLACENTA PREVIA IN THIRD TRIMESTER: Status: ACTIVE | Noted: 2024-05-30

## 2024-05-30 PROBLEM — O34.219 PREVIOUS CESAREAN DELIVERY AFFECTING PREGNANCY: Status: ACTIVE | Noted: 2024-05-30

## 2024-05-30 PROBLEM — O43.213 PLACENTA ACCRETA IN THIRD TRIMESTER: Status: ACTIVE | Noted: 2024-05-30

## 2024-05-30 PROBLEM — O42.90 LEAKAGE OF AMNIOTIC FLUID: Status: ACTIVE | Noted: 2024-05-30

## 2024-05-30 PROBLEM — O42.90 AMNIOTIC FLUID LEAKING: Status: ACTIVE | Noted: 2024-05-30

## 2024-05-30 LAB
ABO/RH: NORMAL
AMPHETAMINES: NEGATIVE
ANTIBODY SCREEN: NEGATIVE
BACTERIA: ABNORMAL /HPF
BARBITURATE SCREEN URINE: NEGATIVE
BENZODIAZEPINE SCREEN, URINE: NEGATIVE
BILIRUBIN, URINE: NEGATIVE MG/DL
BLOOD, URINE: ABNORMAL
CANNABINOID SCREEN URINE: NEGATIVE
CLARITY: ABNORMAL
COCAINE METABOLITE: NEGATIVE
COLOR: YELLOW
FENTANYL URINE: NEGATIVE
GLUCOSE URINE: NEGATIVE MG/DL
HCT VFR BLD CALC: 37.4 % (ref 37–47)
HEMOGLOBIN: 12 GM/DL (ref 12.5–16)
KETONES, URINE: NEGATIVE MG/DL
LEUKOCYTE ESTERASE, URINE: ABNORMAL
MCH RBC QN AUTO: 29.1 PG (ref 27–31)
MCHC RBC AUTO-ENTMCNC: 32.1 % (ref 32–36)
MCV RBC AUTO: 90.8 FL (ref 78–100)
MUCUS: ABNORMAL HPF
NITRITE URINE, QUANTITATIVE: NEGATIVE
OPIATES, URINE: NEGATIVE
OXYCODONE: NEGATIVE
PDW BLD-RTO: 12.9 % (ref 11.7–14.9)
PH, URINE: 7 (ref 5–8)
PLATELET # BLD: 199 K/CU MM (ref 140–440)
PMV BLD AUTO: 10.8 FL (ref 7.5–11.1)
PROTEIN UA: 100 MG/DL
RBC # BLD: 4.12 M/CU MM (ref 4.2–5.4)
RBC URINE: 42 /HPF (ref 0–6)
SPECIFIC GRAVITY UA: 1.02 (ref 1–1.03)
SQUAMOUS EPITHELIAL: 24 /HPF
TRICHOMONAS: ABNORMAL /HPF
UROBILINOGEN, URINE: 0.2 MG/DL (ref 0.2–1)
WBC # BLD: 5.7 K/CU MM (ref 4–10.5)
WBC UA: 62 /HPF (ref 0–5)

## 2024-05-30 PROCEDURE — 1220000000 HC SEMI PRIVATE OB R&B

## 2024-05-30 PROCEDURE — 80307 DRUG TEST PRSMV CHEM ANLYZR: CPT

## 2024-05-30 PROCEDURE — 6360000002 HC RX W HCPCS

## 2024-05-30 PROCEDURE — 86901 BLOOD TYPING SEROLOGIC RH(D): CPT

## 2024-05-30 PROCEDURE — 85027 COMPLETE CBC AUTOMATED: CPT

## 2024-05-30 PROCEDURE — 86780 TREPONEMA PALLIDUM: CPT

## 2024-05-30 PROCEDURE — 81001 URINALYSIS AUTO W/SCOPE: CPT

## 2024-05-30 PROCEDURE — 99222 1ST HOSP IP/OBS MODERATE 55: CPT | Performed by: STUDENT IN AN ORGANIZED HEALTH CARE EDUCATION/TRAINING PROGRAM

## 2024-05-30 PROCEDURE — 86900 BLOOD TYPING SEROLOGIC ABO: CPT

## 2024-05-30 PROCEDURE — 86850 RBC ANTIBODY SCREEN: CPT

## 2024-05-30 RX ORDER — CITRIC ACID/SODIUM CITRATE 334-500MG
30 SOLUTION, ORAL ORAL ONCE
Status: DISCONTINUED | OUTPATIENT
Start: 2024-05-30 | End: 2024-05-30 | Stop reason: HOSPADM

## 2024-05-30 RX ORDER — SODIUM CHLORIDE 0.9 % (FLUSH) 0.9 %
5-40 SYRINGE (ML) INJECTION EVERY 12 HOURS SCHEDULED
Status: DISCONTINUED | OUTPATIENT
Start: 2024-05-30 | End: 2024-05-30 | Stop reason: HOSPADM

## 2024-05-30 RX ORDER — SODIUM CHLORIDE, SODIUM LACTATE, POTASSIUM CHLORIDE, AND CALCIUM CHLORIDE .6; .31; .03; .02 G/100ML; G/100ML; G/100ML; G/100ML
1000 INJECTION, SOLUTION INTRAVENOUS ONCE
Status: DISCONTINUED | OUTPATIENT
Start: 2024-05-30 | End: 2024-05-30 | Stop reason: HOSPADM

## 2024-05-30 RX ORDER — SODIUM CHLORIDE, SODIUM LACTATE, POTASSIUM CHLORIDE, CALCIUM CHLORIDE 600; 310; 30; 20 MG/100ML; MG/100ML; MG/100ML; MG/100ML
INJECTION, SOLUTION INTRAVENOUS CONTINUOUS
Status: DISCONTINUED | OUTPATIENT
Start: 2024-05-30 | End: 2024-05-30 | Stop reason: HOSPADM

## 2024-05-30 RX ORDER — FAMOTIDINE 10 MG/ML
20 INJECTION, SOLUTION INTRAVENOUS ONCE
Status: DISCONTINUED | OUTPATIENT
Start: 2024-05-30 | End: 2024-05-30 | Stop reason: HOSPADM

## 2024-05-30 RX ORDER — BETAMETHASONE SODIUM PHOSPHATE AND BETAMETHASONE ACETATE 3; 3 MG/ML; MG/ML
12 INJECTION, SUSPENSION INTRA-ARTICULAR; INTRALESIONAL; INTRAMUSCULAR; SOFT TISSUE ONCE
Status: COMPLETED | OUTPATIENT
Start: 2024-05-30 | End: 2024-05-30

## 2024-05-30 RX ORDER — ACETAMINOPHEN 325 MG/1
975 TABLET ORAL ONCE
Status: DISCONTINUED | OUTPATIENT
Start: 2024-05-30 | End: 2024-05-30 | Stop reason: HOSPADM

## 2024-05-30 RX ORDER — ONDANSETRON 2 MG/ML
4 INJECTION INTRAMUSCULAR; INTRAVENOUS EVERY 6 HOURS PRN
Status: DISCONTINUED | OUTPATIENT
Start: 2024-05-30 | End: 2024-05-30 | Stop reason: HOSPADM

## 2024-05-30 RX ORDER — SODIUM CHLORIDE 0.9 % (FLUSH) 0.9 %
10 SYRINGE (ML) INJECTION PRN
Status: DISCONTINUED | OUTPATIENT
Start: 2024-05-30 | End: 2024-05-30 | Stop reason: HOSPADM

## 2024-05-30 RX ORDER — SODIUM CHLORIDE 9 MG/ML
INJECTION, SOLUTION INTRAVENOUS PRN
Status: DISCONTINUED | OUTPATIENT
Start: 2024-05-30 | End: 2024-05-30 | Stop reason: HOSPADM

## 2024-05-30 RX ADMIN — BETAMETHASONE SODIUM PHOSPHATE AND BETAMETHASONE ACETATE 12 MG: 3; 3 INJECTION, SUSPENSION INTRA-ARTICULAR; INTRALESIONAL; INTRAMUSCULAR at 19:26

## 2024-05-30 NOTE — DISCHARGE SUMMARY
Department of Obstetrics and Gynecology  Labor and Delivery  Obstetrical Discharge Form    Name:  Dean Engle   CSN: [id number]   Attending Provider: Allison Nguyen DO  Location:  [facility]/TRI03-A   : 1995   Age: 28 y.o.    Date of Admission:    2024    Date of Discharge:      24    Admission Diagnosis:  PPROM with placenta previa, possible placenta increta with hx of previous  section, breech fetal presentation  Present on Admission:   Leakage of amniotic fluid   Amniotic fluid leaking   Placenta previa in third trimester   Previous  delivery affecting pregnancy   Breech presentation of fetus   Placenta accreta in third trimester      Discharge Diagnosis:     Same    Procedure:  None    Hospital course:  Patient was seen for PPROM. She has a placenta previa and upon review of outside records has possible placenta increta. Decision was made to transfer patient to higher acuity center due to possible placenta increta with hx of previous  section, PPROM, breech fetal presentation. She was transferred to [facility] for further care.       Data:  Diagnostics:  No results found.    Labs:  Results for orders placed or performed during the hospital encounter of 24   COVID-19, Rapid    Specimen: Nasopharyngeal   Result Value Ref Range    Source UNKNOWN     SARS-CoV-2, NAAT NOT DETECTED NOT DETECTED   Influenza A/B, Molecular    Specimen: Nasal   Result Value Ref Range    Influenza A Antigen NOT DETECTED NOT DETECTED    Influenza B Antigen NOT DETECTED NOT DETECTED   CBC with Auto Differential   Result Value Ref Range    WBC 6.5 4.0 - 10.5 K/CU MM    RBC 4.83 4.2 - 5.4 M/CU MM    Hemoglobin 14.3 12.5 - 16.0 GM/DL    Hematocrit 43.1 37 - 47 %    MCV 89.2 78 - 100 FL    MCH 29.6 27 - 31 PG    MCHC 33.2 32.0 - 36.0 %    RDW 12.5 11.7 - 14.9 %    Platelets 234 140 - 440 K/CU MM    MPV 10.1 7.5 - 11.1 FL    Differential Type AUTOMATED DIFFERENTIAL     Segs Relative 60.9 36 - 66 %     Aminofen  Take 2 tablets by mouth every 6 hours as needed for Pain     Prenatal Vitamin/Min +DHA 27-0.8-200 MG Caps  Take 1 tablet by mouth daily (may substitute any prenatal vitamin covered by insurance, ok for prenatal without DHA if DHA based prenatal is not covered)              Plan:   Patient being transferred to [facility] for further evaluation and care due to possible placenta increta with PPROM.       Allison Nguyen, DO

## 2024-05-30 NOTE — PROGRESS NOTES
MARGOT Gross CNM reviewing prenatal record for [facility]. US shows possible increta. Dr. Nguyen updated.

## 2024-05-30 NOTE — H&P
Department of Obstetrics and Gynecology  Labor and Delivery  History and Physical   Name:  Dean Engle   CSN: [id number]   Attending Provider: Allison Nguyen DO  Location:  [facility]I03-A   : 1995   Age: 28 y.o.    REASON FOR ADMISSION: PPROM    SUBJECTIVE:  The patient is a 28 y.o.  at 33w5d weeks who presented to Labor and Delivery with PPROM.    +FM. Patient denies vaginal bleeding, reports leakage of fluid, denies contractions, denies headache, denies vision changes, denies right upper quadrant pain and denies edema.    ROS:  General: Denies fevers, chills, excessive fatigue  CV: Denies chest pain, shortness of breath, palpitations  Resp: Denies shortness of breath, cough, pain upon inspiration  GI: Denies abnormal bowel movements, change in stool color, hematochezia  : Denies dysuria, hematuria, foul odor  HEENT: Denies vision changes, difficulty breathing, sore throat  Neuro: Denies dizziness, weakness or tingling in unilateral or bilateral extremities  MSK: Denies unilateral swelling, back pain, restricted mobility  Skin: Denies rashes, erythema, or excessive bruising  Psych: Denies severe mood swings, changes in appetite or sleep habits    Pregnancy complications:  Present on Admission:   Leakage of amniotic fluid   Amniotic fluid leaking   Placenta previa in third trimester   Previous  delivery affecting pregnancy   Breech presentation of fetus   Placenta accreta in third trimester      OBJECTIVE    Vitals:  /87   Pulse 88   Temp 98.2 °F (36.8 °C) (Oral)   Resp 18   Ht 1.626 m (5' 4\")   Wt 99.8 kg (220 lb)   LMP 2023   SpO2 99%   BMI 37.76 kg/m²     Constitutional:  Alert and oriented, no acute distress  Lungs: Unlabored breathing  Abdomen: Gravid, soft, non-tender and non-distended. No pinpoint tenderness  Musculoskeletal: No LE edema or calf TTP    Cervix:  cl/th/hi    Fetal Position:    Breech on bedside US    Membranes:    Intact    Fetal heart rate:

## 2024-05-30 NOTE — PROGRESS NOTES
Patient arrived ambulatory to Labor & Delivery for triage for c/o rupture of membranes at 1500.     Patient shown to room and instructed to place on gown and obtain urine specimen. Patient oriented to room and call light.    EFM & toco applied, +FHR. Abdomen soft and non tender to palpation. Patient denies headache, epigastric pain, abdominal pain. Patient reports feeling her baby move well. Denies vaginal bleeding. Patient states she is karen every 5 minutes.     Prior to SVE, this RN asked patient if she had been told if her placenta was covering her cervix, her placenta was low lying, if she had any placental abnormalities, or if she had been told to not have sex. Patient denies this.     MARGOT Gross CNM to bedside for SVE, speculum exam. Cervix closed. Amnisure positive.

## 2024-05-30 NOTE — PROGRESS NOTES
Dr. Nguyen initiating transfer to [facility]. Transfer center notified. [facility] transport should be here to transport patient between 8-8:30pm

## 2024-05-30 NOTE — PROGRESS NOTES
Patient updated to plan of care, transport to arrive between 8-8:30. Patient states understanding.

## 2024-05-30 NOTE — PROGRESS NOTES
Lab records requested from [employer].     Report called to Tiffanie Escalante RN at [facility].

## 2024-05-31 ENCOUNTER — APPOINTMENT (OUTPATIENT)
Dept: ULTRASOUND IMAGING | Age: 29
End: 2024-05-31
Payer: COMMERCIAL

## 2024-05-31 ENCOUNTER — HOSPITAL ENCOUNTER (OUTPATIENT)
Age: 29
LOS: 1 days | Discharge: ANOTHER HOSPITAL | End: 2024-05-31
Attending: STUDENT IN AN ORGANIZED HEALTH CARE EDUCATION/TRAINING PROGRAM | Admitting: STUDENT IN AN ORGANIZED HEALTH CARE EDUCATION/TRAINING PROGRAM
Payer: COMMERCIAL

## 2024-05-31 VITALS
HEART RATE: 61 BPM | RESPIRATION RATE: 16 BRPM | SYSTOLIC BLOOD PRESSURE: 116 MMHG | OXYGEN SATURATION: 99 % | DIASTOLIC BLOOD PRESSURE: 65 MMHG | TEMPERATURE: 98.3 F

## 2024-05-31 PROBLEM — O44.03 COMPLETE PLACENTA PREVIA NOS OR WITHOUT HEMORRHAGE, THIRD TRIMESTER: Status: ACTIVE | Noted: 2024-05-31

## 2024-05-31 LAB
BACTERIA: ABNORMAL /HPF
BILIRUBIN, URINE: NEGATIVE MG/DL
BLOOD, URINE: ABNORMAL
CLARITY: CLEAR
COLOR: YELLOW
GLUCOSE URINE: NEGATIVE MG/DL
KETONES, URINE: NEGATIVE MG/DL
LEUKOCYTE ESTERASE, URINE: ABNORMAL
Lab: NORMAL
NITRITE URINE, QUANTITATIVE: NEGATIVE
PH, URINE: 6.5 (ref 5–8)
PROTEIN UA: NEGATIVE MG/DL
RBC URINE: 2 /HPF (ref 0–6)
SPECIFIC GRAVITY UA: <1.005 (ref 1–1.03)
SPECIMEN: NORMAL
SQUAMOUS EPITHELIAL: 7 /HPF
T. PALLIDUM SCREEN: NEGATIVE
TRICHOMONAS: ABNORMAL /HPF
UROBILINOGEN, URINE: 0.2 MG/DL (ref 0.2–1)
WBC UA: 6 /HPF (ref 0–5)
WET PREP: NORMAL

## 2024-05-31 PROCEDURE — 87210 SMEAR WET MOUNT SALINE/INK: CPT

## 2024-05-31 PROCEDURE — 96372 THER/PROPH/DIAG INJ SC/IM: CPT

## 2024-05-31 PROCEDURE — 99214 OFFICE O/P EST MOD 30 MIN: CPT

## 2024-05-31 PROCEDURE — 99214 OFFICE O/P EST MOD 30 MIN: CPT | Performed by: STUDENT IN AN ORGANIZED HEALTH CARE EDUCATION/TRAINING PROGRAM

## 2024-05-31 PROCEDURE — 87591 N.GONORRHOEAE DNA AMP PROB: CPT

## 2024-05-31 PROCEDURE — 76815 OB US LIMITED FETUS(S): CPT

## 2024-05-31 PROCEDURE — 81001 URINALYSIS AUTO W/SCOPE: CPT

## 2024-05-31 PROCEDURE — 6360000002 HC RX W HCPCS: Performed by: ADVANCED PRACTICE MIDWIFE

## 2024-05-31 PROCEDURE — 87491 CHLMYD TRACH DNA AMP PROBE: CPT

## 2024-05-31 PROCEDURE — 84112 EVAL AMNIOTIC FLUID PROTEIN: CPT

## 2024-05-31 RX ORDER — BETAMETHASONE SODIUM PHOSPHATE AND BETAMETHASONE ACETATE 3; 3 MG/ML; MG/ML
12 INJECTION, SUSPENSION INTRA-ARTICULAR; INTRALESIONAL; INTRAMUSCULAR; SOFT TISSUE ONCE
Status: COMPLETED | OUTPATIENT
Start: 2024-05-31 | End: 2024-05-31

## 2024-05-31 RX ADMIN — BETAMETHASONE SODIUM PHOSPHATE AND BETAMETHASONE ACETATE 12 MG: 3; 3 INJECTION, SUSPENSION INTRA-ARTICULAR; INTRALESIONAL; INTRAMUSCULAR at 19:32

## 2024-05-31 NOTE — CONSULTS
Session ID: [id number]  Language: Cymraes Creole   ID: #[id number]   Name: Fumance    Patient family member ( cousin to the patient ) updated on transport arrival and family member escorted by [facility] team member to ambulance to transfer with patient.

## 2024-05-31 NOTE — FLOWSHEET NOTE
Pt presents amb to unit for second dose of celestone. Shown to Lt 3. Instructed on CCUA. Pt states was transferred to [place] yesterday from [facility] and was then discharged from [facility] and told to return to birthing center at [facility] for second dose of celestone. Denies leaking fluid or vaginal bleeding at this time. Interpretor services used via language line. Fariba SHARMA notified of pt arrival.

## 2024-05-31 NOTE — CONSULTS
Session ID: [id number]  Language: Namibian Crezay   ID: #[id number]   Name: Scott    Assessment complete and POC reviewed with patient. Patient voices understanding and agreement. Denies any questions or needs at this time. Patient updated on ETA of transport team.

## 2024-05-31 NOTE — PROGRESS NOTES
Department of Obstetrics and Gynecology  Labor and Delivery  TRIAGE NOTE  Name:  Dean Engle   CSN: [id number]   Attending Provider: Allison Nguyen DO  Location:  TRI03/TRI03-A   : 1995   Age: 28 y.o.    SUBJECTIVE:  The patient is a 28 y.o.  at 33w6d weeks who presented to triage this evening stating she needs her second celestone injection after being discharged from [facility] yesterday following transfer from this facility.     +FM. Patient denies vaginal bleeding, denies leakage of fluid, denies contractions, denies headache, denies vision changes, denies right upper quadrant pain and denies edema.    OBJECTIVE    Primary Care Physician:  No primary care provider on file.    No Known Allergies    Current Facility-Administered Medications   Medication Dose Route Frequency Provider Last Rate Last Admin    betamethasone acetate-betamethasone sodium phosphate (CELESTONE) injection 12 mg  12 mg IntraMUSCular Once Fariba Wilson APRN - CNM           Past Medical History:   Diagnosis Date    Irregular menses        Past Surgical History:   Procedure Laterality Date     SECTION         Social History     Socioeconomic History    Marital status:      Spouse name: Not on file    Number of children: Not on file    Years of education: Not on file    Highest education level: Not on file   Occupational History    Not on file   Tobacco Use    Smoking status: Never    Smokeless tobacco: Never   Vaping Use    Vaping Use: Never used   Substance and Sexual Activity    Alcohol use: Never    Drug use: Never    Sexual activity: Yes     Partners: Male   Other Topics Concern    Not on file   Social History Narrative    Not on file     Social Determinants of Health     Financial Resource Strain: Low Risk  (2023)    Overall Financial Resource Strain (CARDIA)     Difficulty of Paying Living Expenses: Not hard at all   Food Insecurity: Not on file (2023)   Transportation Needs: Unknown (2023)     K/CU MM    RBC 4.12 (L) 4.2 - 5.4 M/CU MM    Hemoglobin 12.0 (L) 12.5 - 16.0 GM/DL    Hematocrit 37.4 37 - 47 %    MCV 90.8 78 - 100 FL    MCH 29.1 27 - 31 PG    MCHC 32.1 32.0 - 36.0 %    RDW 12.9 11.7 - 14.9 %    Platelets 199 140 - 440 K/CU MM    MPV 10.8 7.5 - 11.1 FL   DRUG SCREEN MULTI URINE   Result Value Ref Range    Cannabinoid Scrn, Ur NEGATIVE NEGATIVE    Amphetamines NEGATIVE NEGATIVE    Cocaine Metabolite NEGATIVE NEGATIVE    Benzodiazepine Screen, Urine NEGATIVE NEGATIVE    Barbiturate Screen, Ur NEGATIVE NEGATIVE    Opiates, Urine NEGATIVE NEGATIVE    Oxycodone NEGATIVE NEGATIVE    Fentanyl, Ur NEGATIVE NEGATIVE   T Pallidum Screen W/Reflex   Result Value Ref Range    T. Pallidum Screen NEGATIVE NEGATIVE   Microscopic Urinalysis   Result Value Ref Range    RBC, UA 42 (H) 0 - 6 /HPF    WBC, UA 62 (H) 0 - 5 /HPF    Bacteria, UA RARE (A) NEGATIVE /HPF    Squam Epithel, UA 24 /HPF    Mucus, UA OCCASIONAL (A) NEGATIVE HPF    Trichomonas NONE SEEN NONE SEEN /HPF   TYPE AND SCREEN   Result Value Ref Range    ABO/Rh A POSITIVE     Antibody Screen NEGATIVE        ASSESSMENT AND PLAN:     The patient is a 28 y.o.  at 33w6d weeks     PPROM  Hx of previous  section. Complete placenta previa. Breech fetal presentation.  US with MFM  with findings concerning for placenta accreta spectrum, possible increta.   Patient reported leakage of fluid  at 1500. CNM performed speculum exam yesterday with pooling and meconium stained fluid noted on exam. Positive amnisure at that time. No vaginal bleeding. Patient was not having contractions or in labor at that time. She was given celestone x1 for fetal lung maturation and case was discussed with on call MFM at [facility] who accepted transfer due to PPROM with hx of previous  section breech fetal presentation and placenta accreta with possible increta.   Patient arrives to triage today and reports she was discharged from [facility] and told she was not  ruptured. Upon chart review, no notes are seen from encounter in Care Everywhere. CNM contacted [facility] labor and delivery to confirm care and plan for patient from transfer. Report given that patient was not ruptured per nurse and resident speculum exam and patient was discharged and told to follow up at this facility for second dose of celestone. Upon presentation today, speculum exam performed by myself and pooling noted in speculum with meconium stained fluid. No vaginal bleeding. Amnisure collected and immediately positive. Wet prep and GC were also collected. Bedside MELVI 8cm. Findings on exam remain consistent with PPROM. Second dose of celestone was given. Called and discussed case with MFM at [facility] this evening and will plan for transfer at this time due to PPROM at 33w, hx of previous  section, breech fetal presentation, placenta accreta with concern for increta.     Electronically signed: Allison Nguyen,  2024

## 2024-06-01 NOTE — PROGRESS NOTES
Transport team at bedside, EFM/TOCO removed, transferred to [facility] per self,all belongings and cell phone present with patient. Report given to team, pt leaves facility with no s/s of distress

## 2024-06-01 NOTE — PROGRESS NOTES
Report called to LISSET at [facility] Labor and Delivery [facility], contact information given for further questions.

## 2024-06-02 ENCOUNTER — APPOINTMENT (OUTPATIENT)
Dept: CT IMAGING | Age: 29
End: 2024-06-02
Payer: COMMERCIAL

## 2024-06-02 ENCOUNTER — HOSPITAL ENCOUNTER (OUTPATIENT)
Age: 29
Discharge: HOME | End: 2024-06-03
Attending: STUDENT IN AN ORGANIZED HEALTH CARE EDUCATION/TRAINING PROGRAM | Admitting: STUDENT IN AN ORGANIZED HEALTH CARE EDUCATION/TRAINING PROGRAM
Payer: COMMERCIAL

## 2024-06-02 ENCOUNTER — APPOINTMENT (OUTPATIENT)
Dept: ULTRASOUND IMAGING | Age: 29
End: 2024-06-02
Payer: COMMERCIAL

## 2024-06-02 LAB
ABO/RH: NORMAL
ALBUMIN SERPL-MCNC: 3.4 GM/DL (ref 3.4–5)
ALP BLD-CCNC: 105 IU/L (ref 40–129)
ALT SERPL-CCNC: 20 U/L (ref 10–40)
AMPHETAMINES: NEGATIVE
ANION GAP SERPL CALCULATED.3IONS-SCNC: 11 MMOL/L (ref 7–16)
ANTIBODY SCREEN: NEGATIVE
AST SERPL-CCNC: 20 IU/L (ref 15–37)
BACTERIA: ABNORMAL /HPF
BARBITURATE SCREEN URINE: NEGATIVE
BASOPHILS ABSOLUTE: 0 K/CU MM
BASOPHILS RELATIVE PERCENT: 0.1 % (ref 0–1)
BENZODIAZEPINE SCREEN, URINE: NEGATIVE
BILIRUB SERPL-MCNC: 0 MG/DL (ref 0–1)
BILIRUBIN, URINE: NEGATIVE MG/DL
BLOOD, URINE: ABNORMAL
BUN SERPL-MCNC: 7 MG/DL (ref 6–23)
CALCIUM SERPL-MCNC: 7.9 MG/DL (ref 8.3–10.6)
CANNABINOID SCREEN URINE: NEGATIVE
CHLORIDE BLD-SCNC: 107 MMOL/L (ref 99–110)
CLARITY: CLEAR
CO2: 20 MMOL/L (ref 21–32)
COCAINE METABOLITE: NEGATIVE
COLOR: YELLOW
CREAT SERPL-MCNC: 0.5 MG/DL (ref 0.6–1.1)
DIFFERENTIAL TYPE: ABNORMAL
EOSINOPHILS ABSOLUTE: 0 K/CU MM
EOSINOPHILS RELATIVE PERCENT: 0.3 % (ref 0–3)
FENTANYL URINE: NEGATIVE
GFR, ESTIMATED: >90 ML/MIN/1.73M2
GLUCOSE SERPL-MCNC: 111 MG/DL (ref 70–99)
GLUCOSE URINE: NEGATIVE MG/DL
HCT VFR BLD CALC: 33.7 % (ref 37–47)
HEMOGLOBIN: 10.8 GM/DL (ref 12.5–16)
IMMATURE NEUTROPHIL %: 1.1 % (ref 0–0.43)
KETONES, URINE: NEGATIVE MG/DL
LEUKOCYTE ESTERASE, URINE: ABNORMAL
LYMPHOCYTES ABSOLUTE: 1.8 K/CU MM
LYMPHOCYTES RELATIVE PERCENT: 25.1 % (ref 24–44)
MCH RBC QN AUTO: 28.7 PG (ref 27–31)
MCHC RBC AUTO-ENTMCNC: 32 % (ref 32–36)
MCV RBC AUTO: 89.6 FL (ref 78–100)
MONOCYTES ABSOLUTE: 0.6 K/CU MM
MONOCYTES RELATIVE PERCENT: 8.4 % (ref 0–4)
MUCUS: ABNORMAL HPF
NEUTROPHILS ABSOLUTE: 4.6 K/CU MM
NEUTROPHILS RELATIVE PERCENT: 65 % (ref 36–66)
NITRITE URINE, QUANTITATIVE: NEGATIVE
NUCLEATED RBC %: 1 %
OPIATES, URINE: NEGATIVE
OXYCODONE: NEGATIVE
PDW BLD-RTO: 13.2 % (ref 11.7–14.9)
PH, URINE: 7.5 (ref 5–8)
PLATELET # BLD: 177 K/CU MM (ref 140–440)
PMV BLD AUTO: 10.8 FL (ref 7.5–11.1)
POTASSIUM SERPL-SCNC: 4 MMOL/L (ref 3.5–5.1)
PROTEIN UA: ABNORMAL MG/DL
RBC # BLD: 3.76 M/CU MM (ref 4.2–5.4)
RBC URINE: 2 /HPF (ref 0–6)
RENAL EPITHELIAL, UA: <1 /HPF
SODIUM BLD-SCNC: 138 MMOL/L (ref 135–145)
SPECIFIC GRAVITY UA: 1.01 (ref 1–1.03)
SQUAMOUS EPITHELIAL: 4 /HPF
TOTAL IMMATURE NEUTOROPHIL: 0.08 K/CU MM
TOTAL NUCLEATED RBC: 0.1 K/CU MM
TOTAL PROTEIN: 5.8 GM/DL (ref 6.4–8.2)
TRANSITIONAL EPITHELIAL: <1 /HPF
TRICHOMONAS: ABNORMAL /HPF
TROPONIN, HIGH SENSITIVITY: <6 NG/L (ref 0–14)
UROBILINOGEN, URINE: 0.2 MG/DL (ref 0.2–1)
WBC # BLD: 7 K/CU MM (ref 4–10.5)
WBC UA: 8 /HPF (ref 0–5)

## 2024-06-02 PROCEDURE — 6360000004 HC RX CONTRAST MEDICATION: Performed by: STUDENT IN AN ORGANIZED HEALTH CARE EDUCATION/TRAINING PROGRAM

## 2024-06-02 PROCEDURE — 85025 COMPLETE CBC W/AUTO DIFF WBC: CPT

## 2024-06-02 PROCEDURE — 80053 COMPREHEN METABOLIC PANEL: CPT

## 2024-06-02 PROCEDURE — 93005 ELECTROCARDIOGRAM TRACING: CPT | Performed by: STUDENT IN AN ORGANIZED HEALTH CARE EDUCATION/TRAINING PROGRAM

## 2024-06-02 PROCEDURE — 80307 DRUG TEST PRSMV CHEM ANLYZR: CPT

## 2024-06-02 PROCEDURE — 86900 BLOOD TYPING SEROLOGIC ABO: CPT

## 2024-06-02 PROCEDURE — 86850 RBC ANTIBODY SCREEN: CPT

## 2024-06-02 PROCEDURE — 81001 URINALYSIS AUTO W/SCOPE: CPT

## 2024-06-02 PROCEDURE — 86901 BLOOD TYPING SEROLOGIC RH(D): CPT

## 2024-06-02 PROCEDURE — 84484 ASSAY OF TROPONIN QUANT: CPT

## 2024-06-02 PROCEDURE — 71260 CT THORAX DX C+: CPT

## 2024-06-02 PROCEDURE — 76819 FETAL BIOPHYS PROFIL W/O NST: CPT

## 2024-06-02 PROCEDURE — 99213 OFFICE O/P EST LOW 20 MIN: CPT | Performed by: STUDENT IN AN ORGANIZED HEALTH CARE EDUCATION/TRAINING PROGRAM

## 2024-06-02 RX ADMIN — IOPAMIDOL 75 ML: 755 INJECTION, SOLUTION INTRAVENOUS at 22:19

## 2024-06-03 VITALS
SYSTOLIC BLOOD PRESSURE: 134 MMHG | TEMPERATURE: 98.2 F | OXYGEN SATURATION: 99 % | DIASTOLIC BLOOD PRESSURE: 79 MMHG | RESPIRATION RATE: 16 BRPM | HEART RATE: 56 BPM

## 2024-06-03 DIAGNOSIS — O43.223 PLACENTA INCRETA IN THIRD TRIMESTER: Primary | ICD-10-CM

## 2024-06-03 LAB
C TRACH RRNA SPEC QL NAA+PROBE: NEGATIVE
N GONORRHOEA RRNA SPEC QL NAA+PROBE: NEGATIVE

## 2024-06-03 PROCEDURE — 99214 OFFICE O/P EST MOD 30 MIN: CPT

## 2024-06-03 NOTE — PROGRESS NOTES
Utilizing  # [id number] Pt arrives to unit per self for c/o SOB since last night Pt shown to TR- 02 oriented to room, instructed to change into gown and obtain CC urine sample. Pt reports positive FM, denies bleeding or pain. Abd soft and non-tender, EFM/TOCO applied     at bedside for eval, orders given, PIV placed, labs drawn

## 2024-06-03 NOTE — PROGRESS NOTES
Department of Obstetrics and Gynecology  Labor and Delivery  TRIAGE NOTE  Name:  Dean Engle   CSN: [id number]   Attending Provider: Allison Nguyen DO  Location:  TRI02/TRI02-A   : 1995   Age: 28 y.o.    SUBJECTIVE:  The patient is a 28 y.o.  at 34w1d weeks who presented to triage with chest pain and shortness of breath. Patient reports this started yesterday evening. She reports she was discharged from [facility] yesterday evening and told to return Josefina 10 for a  section.    +FM. Patient denies vaginal bleeding, denies leakage of fluid, denies contractions, denies headache, denies vision changes, denies right upper quadrant pain and denies edema.    OBJECTIVE    Primary Care Physician:  No primary care provider on file.    No Known Allergies    No current facility-administered medications for this encounter.       Past Medical History:   Diagnosis Date    Irregular menses        Past Surgical History:   Procedure Laterality Date     SECTION         Social History     Socioeconomic History    Marital status:      Spouse name: Not on file    Number of children: Not on file    Years of education: Not on file    Highest education level: Not on file   Occupational History    Not on file   Tobacco Use    Smoking status: Never    Smokeless tobacco: Never   Vaping Use    Vaping Use: Never used   Substance and Sexual Activity    Alcohol use: Never    Drug use: Never    Sexual activity: Yes     Partners: Male   Other Topics Concern    Not on file   Social History Narrative    Not on file     Social Determinants of Health     Financial Resource Strain: Low Risk  (2023)    Overall Financial Resource Strain (CARDIA)     Difficulty of Paying Living Expenses: Not hard at all   Food Insecurity: Not on file (2023)   Transportation Needs: Unknown (2023)    PRAPARE - Transportation     Lack of Transportation (Medical): Not on file     Lack of Transportation (Non-Medical): No

## 2024-06-03 NOTE — PROGRESS NOTES
reviewed US results, BPP results noted, new order to discharge home and follow up with [facility]

## 2024-06-03 NOTE — PROGRESS NOTES
Utilizing  #[id number] Pt reports improvement in symptoms, no SOB or pain reported, extensive education given on kick counts, vaginal bleeding or leaking. Pt expressed understanding, AVS and discharge instructions reviewed with patient. All questions answered.    Pt reports trouble with transportation home, [facility] notified, will arrive to facility in 10 min. Patient given transportation voucher and instructions to ED entrance. Pt ambulates off of unit in with steady gait in stable condition with all belongings. Pt instructed to return to facility or notify physician with changes in status.

## 2024-06-03 NOTE — PROGRESS NOTES
EFM/TOCO removed, utilizing  #[id number], pt updated on POC, agreeable with scans. Transport to bedside, pt taken to CT via WC

## 2024-06-04 LAB
EKG ATRIAL RATE: 60 BPM
EKG DIAGNOSIS: NORMAL
EKG P AXIS: 32 DEGREES
EKG P-R INTERVAL: 122 MS
EKG Q-T INTERVAL: 404 MS
EKG QRS DURATION: 72 MS
EKG QTC CALCULATION (BAZETT): 404 MS
EKG R AXIS: 18 DEGREES
EKG T AXIS: 26 DEGREES
EKG VENTRICULAR RATE: 60 BPM

## 2024-06-06 ENCOUNTER — ROUTINE PRENATAL (OUTPATIENT)
Dept: OBGYN | Age: 29
End: 2024-06-06
Payer: COMMERCIAL

## 2024-06-06 VITALS — DIASTOLIC BLOOD PRESSURE: 88 MMHG | WEIGHT: 250 LBS | SYSTOLIC BLOOD PRESSURE: 139 MMHG | BODY MASS INDEX: 42.91 KG/M2

## 2024-06-06 DIAGNOSIS — Z3A.34 34 WEEKS GESTATION OF PREGNANCY: ICD-10-CM

## 2024-06-06 DIAGNOSIS — Z34.83 PRENATAL CARE, SUBSEQUENT PREGNANCY, THIRD TRIMESTER: Primary | ICD-10-CM

## 2024-06-06 PROCEDURE — 99212 OFFICE O/P EST SF 10 MIN: CPT | Performed by: OBSTETRICS & GYNECOLOGY

## 2024-06-06 ASSESSMENT — PATIENT HEALTH QUESTIONNAIRE - PHQ9
SUM OF ALL RESPONSES TO PHQ9 QUESTIONS 1 & 2: 0
SUM OF ALL RESPONSES TO PHQ QUESTIONS 1-9: 0
SUM OF ALL RESPONSES TO PHQ QUESTIONS 1-9: 0
2. FEELING DOWN, DEPRESSED OR HOPELESS: NOT AT ALL
1. LITTLE INTEREST OR PLEASURE IN DOING THINGS: NOT AT ALL
SUM OF ALL RESPONSES TO PHQ QUESTIONS 1-9: 0
SUM OF ALL RESPONSES TO PHQ QUESTIONS 1-9: 0

## 2024-06-06 NOTE — PROGRESS NOTES
Return OB Office Visit    CC:   Chief Complaint   Patient presents with    Routine Prenatal Visit     RH pt, u/s today showing complete previa with ? Increta. Gbs today. Pt delivering 6/10/2024 @ .        HPI:  Patient seen and examined. No concerns/complaints. Denies VB, LOF, ctx. +Fm.  Denies headaches, vision changes, RUQ pain, increased LE edema.  Denies chest pain, shortness of breath, fever, chills, nausea, vomiting.      Review of Systems: The following ROS was otherwise negative, except as noted in the HPI: constitutional, HEENT, respiratory, cardiovascular, gastrointestinal, genitourinary, skin, musculoskeletal, neurological, psych    Objective:  /88   Wt 113.4 kg (250 lb)   LMP 2023   BMI 42.91 kg/m²     Physical Exam    Gravid, non tender, no flank pain    FHR: + by doppler    Assessment/Plan:   Dean Engle is a 28 y.o.  at 34w5d who presents for routine OB visit     Diagnosis Orders   1. Prenatal care, subsequent pregnancy, third trimester  Culture, Strep B Screen, Vaginal/Rectal      2. 34 weeks gestation of pregnancy  Culture, Strep B Screen, Vaginal/Rectal      Normal MELVI today.  No evidence of rupture membranes.  Plans for  section with [facility] next week    Data Unavailable     Chaperone Yamilka Mclean was present for the entire appointment.      All up-to-date obstetric labwork and imaging reviewed for today's encounter.     Patient was instructed to watch for vaginal bleeding or loss of fluid.  She will call with increasing contractions.  Fetal kick counts were discussed.  She will maintain her prenatal vitamin every day.    No follow-ups on file.    Gael Price MD

## 2024-06-09 LAB — GP B STREP SPEC QL CULT: NORMAL

## 2024-06-11 LAB — GP B STREP SPEC QL CULT: NORMAL

## 2024-06-15 ENCOUNTER — HOSPITAL ENCOUNTER (OUTPATIENT)
Age: 29
Discharge: HOME | End: 2024-06-15
Attending: STUDENT IN AN ORGANIZED HEALTH CARE EDUCATION/TRAINING PROGRAM | Admitting: OBSTETRICS & GYNECOLOGY
Payer: COMMERCIAL

## 2024-06-15 VITALS — SYSTOLIC BLOOD PRESSURE: 120 MMHG | DIASTOLIC BLOOD PRESSURE: 79 MMHG | RESPIRATION RATE: 18 BRPM | HEART RATE: 92 BPM

## 2024-06-15 DIAGNOSIS — O44.03 PLACENTA PREVIA IN THIRD TRIMESTER: ICD-10-CM

## 2024-06-15 DIAGNOSIS — O34.219 PREVIOUS CESAREAN DELIVERY AFFECTING PREGNANCY: Primary | ICD-10-CM

## 2024-06-15 DIAGNOSIS — O44.03 COMPLETE PLACENTA PREVIA NOS OR WITHOUT HEMORRHAGE, THIRD TRIMESTER: ICD-10-CM

## 2024-06-15 DIAGNOSIS — Z98.891 S/P REPEAT LOW TRANSVERSE C-SECTION: ICD-10-CM

## 2024-06-15 PROCEDURE — 99212 OFFICE O/P EST SF 10 MIN: CPT

## 2024-06-15 PROCEDURE — 99213 OFFICE O/P EST LOW 20 MIN: CPT

## 2024-06-15 RX ORDER — SIMETHICONE 80 MG
80 TABLET,CHEWABLE ORAL 4 TIMES DAILY PRN
Qty: 180 TABLET | Refills: 3 | Status: SHIPPED | OUTPATIENT
Start: 2024-06-15

## 2024-06-15 RX ORDER — IBUPROFEN 800 MG/1
800 TABLET ORAL 2 TIMES DAILY PRN
Qty: 180 TABLET | Refills: 3 | Status: SHIPPED | OUTPATIENT
Start: 2024-06-15

## 2024-06-15 RX ORDER — DOCUSATE SODIUM 100 MG/1
100 CAPSULE, LIQUID FILLED ORAL 2 TIMES DAILY PRN
Qty: 60 CAPSULE | Refills: 0 | Status: SHIPPED | OUTPATIENT
Start: 2024-06-15

## 2024-06-15 RX ORDER — OXYCODONE HYDROCHLORIDE 5 MG/1
5 TABLET ORAL EVERY 6 HOURS PRN
Qty: 20 TABLET | Refills: 0 | Status: SHIPPED | OUTPATIENT
Start: 2024-06-15 | End: 2024-06-22

## 2024-06-15 RX ORDER — FERROUS SULFATE 325(65) MG
325 TABLET ORAL 2 TIMES DAILY
Qty: 60 TABLET | Refills: 0 | Status: SHIPPED | OUTPATIENT
Start: 2024-06-15

## 2024-06-15 NOTE — PROGRESS NOTES
Pt presents for Medication Rxs.   Pt delivered at [facility] and is unable to get her Rx from [facility] on Thursday after she was discharged.   Incision check done while in triage as well. Incision is classical and was cleaned and steristrips were left in place.   Pt handed paper rxs to take to the pharmacy and is to keep appointment at [facility] next week.     /79   Pulse 92   LMP 05/03/2023   Breastfeeding Unknown      Will discharge pt home   Follow up in office     I have collaborated and updated Dr Ibrahim and the MD agrees with the current POC.

## 2024-06-15 NOTE — DISCHARGE INSTRUCTIONS
Apre Akouchman: Enstwiksyon thien Swen Sante Ou  Postpartum: Care Instructions  Enstriksyon thien Swen Ou  Apre akouchman (peryòd apre akbarrie), gen anpil chanjman ki fèt nan kò ou. Kèk nan chanjman sa yo fèt frank plizyè semèn. Nan kèk èdtan apre akouchman an, kò ou ap kòmanse rosalinda nan akouchman an pandan l ap prepare thien bay tibebe ou lèt mary beth ou. Ou ka melida ou emosyonèl pandan moman sa a. Òmòn ou yo kapab chanje imè ou weaver pa gen rezon ki klè thien sa.  Nan premye dezoutwa semèn ki nita apre akouchman an, anpil fanm gen emosyon ki chanje ant kontantman ak tristès. Ou ka jwenn li difisil thien dòmi. Ou kriye anpil. Sa rele \"bebiblou.\" Emosyon akablan sa yo benny souvan nan dezoutwa lisa oswa semèn. Men li enpòtan thien diskite frank sansasyon ou yo avèk doktè ou.  Li catherine thien twò w fatige ak akable pandan premye semèn yo ki nita apre akouchman ou. Pa eseye fè twòp. Repoze ou kèlkeswa lè ou kapab, aksepte èd lòt moun yo, manje bymargi, epitou bwè anpil likid.  4 a 6 semèn apeprè apre tibebe w alexia fin fèt, w ap kapab fè yon vizit anplis regan doktè. Pandan vizit la, w ap kapab pale ak doktè a konsènan nenpòt bagay ki ba w preyokipasyon oswa ki konn fè w kriye. Vizit sa a se moman ou thien pale avèk doktè ou konsènan nenpòt bagay ou konsène oswa kirye thien li.  Swen siplemantè se yon simin enpòtan nan tretman ak sekirite ou. Pa bliye pran tout randevou yo epi pa manjay yo. Se yon bon lide alexandro thien konnen rezilta tès ou yo, epitou thien konsève yon lis medikaman w ap pran yo.  Kijan ou kapab pran swen tèt ou lakay ou?  Dòmi oswa repoze ou lè tibebe ou ap dòmi.  Si ou kapab, chèche jwenn èd fanmi ou oswa zanmi ou thien fè travay nan regan la. Pa eseye fè tout travay yo poukont ou.  Si ou gen emowoyid oswa anfleman oswa doulè toutotou ouvèti vajen ou, eseye itilize frèt ak cho. Ou kapab mete glas oswa yon konprès frèt frank zòn nan pandan 10 a 20 minit alafwa. Mete yon mòso twal koton mens ant glas la ak po kò ou. Epitou, eseye kristie a yon

## 2024-06-15 NOTE — CONSULTS
Session ID: [id number]  Language: Swazi Creole   ID: #[id number]   Name: Prince      Pt presents amb to unit for c/o delivering at [facility] on Monday and pt states was discharged on Thursday and was unable to  her prescriptions at the [facility] pharmacy. States started having pain last night and was unable to sleep. POC discussed. Yi SHARMA notified of pt arrival.

## 2024-06-15 NOTE — FLOWSHEET NOTE
Discharge instruction given and explained. Instructed to follow up in office as scheduled. Instructed on incisional care. Prescription given. Pt verbalized understanding and left amb from unit for home without distress.

## 2025-06-19 ENCOUNTER — HOSPITAL ENCOUNTER (EMERGENCY)
Age: 30
Discharge: HOME | End: 2025-06-19
Payer: COMMERCIAL

## 2025-06-19 VITALS
HEART RATE: 76 BPM | HEIGHT: 62 IN | SYSTOLIC BLOOD PRESSURE: 132 MMHG | TEMPERATURE: 98.1 F | OXYGEN SATURATION: 98 % | BODY MASS INDEX: 37.76 KG/M2 | WEIGHT: 205.2 LBS | DIASTOLIC BLOOD PRESSURE: 86 MMHG | RESPIRATION RATE: 18 BRPM

## 2025-06-19 DIAGNOSIS — N64.4 BREAST PAIN: ICD-10-CM

## 2025-06-19 DIAGNOSIS — L03.313 CELLULITIS OF CHEST WALL: Primary | ICD-10-CM

## 2025-06-19 PROCEDURE — 99283 EMERGENCY DEPT VISIT LOW MDM: CPT

## 2025-06-19 RX ORDER — CEPHALEXIN 500 MG/1
500 CAPSULE ORAL 2 TIMES DAILY
Qty: 14 CAPSULE | Refills: 0 | Status: SHIPPED | OUTPATIENT
Start: 2025-06-19 | End: 2025-06-26

## 2025-06-19 ASSESSMENT — PAIN SCALES - GENERAL: PAINLEVEL_OUTOF10: 5

## 2025-06-19 ASSESSMENT — PAIN DESCRIPTION - LOCATION: LOCATION: BREAST

## 2025-06-19 ASSESSMENT — PAIN DESCRIPTION - ORIENTATION: ORIENTATION: RIGHT

## 2025-06-19 ASSESSMENT — PAIN - FUNCTIONAL ASSESSMENT: PAIN_FUNCTIONAL_ASSESSMENT: 0-10

## 2025-06-19 NOTE — DISCHARGE INSTRUCTIONS
Providers Welcoming New Patients:      [facility]    Amilcar Morris, APRN-CNP  [address] [place] [address]  ?? [phone]    Mis Flores, APRN-CNP  [address] [place] [address]  ?? [phone]    Rehan Gunderson M.D.  [address] [place] [address] [place] [address]  ?? [phone]    Nelda Butterfield, APRN-CNP  Breana Rolon, APRN-CNP  [address], [place] [address]  ?? [phone]    Bonnie Romo M.D.  Sonny Osuna M.D.   SRIDEVI Yuan, APRN-CNP  [address], [place] [address]  ?? [phone]    Lester Evans M.D.  [address], [place] [address]  ?? [phone]    Deysi Joy, APRN-CNP  [address], [place] [address]  ?? [phone]        [facility]    Jones Reynolds M.D.  Addis Price NP  [address] [place] [address]  ?? [phone]    Mary Carmen Nolen, APRN-CNP - Pediatrics only  [address], [place] [address]  ?? [phone]    Aj Moore M.D.  Vish Willis M.D.  [address], [place] [address]  ?? [phone]    Gilberto Bueno, APRN-CNP  [address], [place] [address]  ?? [phone]      ?? Please note that new patient appointment wait times may vary among providers.      Please go to [employer] or call [phone] to find a new provider.  You can also use the QR code below:          ++++++++++++++++++++++++++++++++++++++++++++++++++++++++++++++++++++      For Acute Care Needs or Prescription Refills Before Your New Patient Appointment:    [facility]  [address], [place] [address]  ?? [phone]    [facility]  [address] [address], [place] [address]  ?? [phone]

## 2025-06-19 NOTE — ED PROVIDER NOTES
labs were within normal range or not returned as of this dictation.    EMERGENCY DEPARTMENT COURSE and DIFFERENTIAL DIAGNOSIS/MDM:   Vitals:    Vitals:    06/19/25 1601 06/19/25 1636 06/19/25 1637   BP: 132/86     Pulse: 76     Resp: 18     Temp: 98.1 °F (36.7 °C)     SpO2: 98%     Weight:  93.1 kg (205 lb 3.2 oz)    Height:   1.575 m (5' 2\")       Patient was given the following medications:  Medications - No data to display      Chronic Conditions affecting care:    has a past medical history of Irregular menses.    CONSULTS: (Who and What was discussed)  None    Records Reviewed (External, Source and Summary) delivery on 6/10/2024 with hysterectomy with Dr. Cole    CC/HPI Summary, DDx, ED Course, and Reassessment: See HPI and physical above    Patient presenting with clear milky drainage from right nipple with expression.  Does have history of breast-feeding.  Also tenderness minimal warmth noted.  On physical examination no lymphadenopathy no skin color changes no abnormal nipple drainage noted.  No masses noted patient not febrile not tachycardic not meeting sepsis markers. will treat for potential developing cellulitis versus mastitis.  Educated patient on Keflex usage.  Educated patient not to stimulate breasts.  Patient has history of hysterectomy.  Is not currently breast-feeding.  Educated patient need to follow-up with primary care for ultrasound/mammogram if any masses or continued symptoms.  Patient verbalized understanding.  Provided with outpatient primary care resources.  Disposition Considerations (tests considered but not done, Admit vs D/C, Shared Decision Making, Pt Expectation of Test or Tx.): Patient discharged in stable condition with close outpatient follow-up       The patient tolerated their visit well.  The patient and / or the family were informed of the results of any tests, a time was given to answer questions, a plan was proposed and they agreed with plan.    I am the Primary

## 2025-06-19 NOTE — CONSULTS
Session ID: [id number]  Session Duration: 2 minutes  Language: Lithuanian Crezay   ID: #[id number]   Name: Sharon
Session ID: [id number]  Session Duration: 5 minutes  Language: Chadianconstantine Quintanilla   ID: #[id number]   Name: Emy
Session ID: [id number]  Session Duration: 11 minutes  Language: Jordanianconstantine Quintanilla   ID: #[id number]   Name: Dayan
Declines

## 2025-06-19 NOTE — ED TRIAGE NOTES
Addended by: GONZALEZ PALOMO on: 2/28/2024 03:44 PM     Modules accepted: Orders     Right breast pain for a month. Says her nipple hurts the most. Not pregnant, had hysterectomy.

## 2025-07-02 ENCOUNTER — OFFICE VISIT (OUTPATIENT)
Dept: FAMILY MEDICINE CLINIC | Age: 30
End: 2025-07-02

## 2025-07-02 ENCOUNTER — OFFICE VISIT (OUTPATIENT)
Dept: FAMILY MEDICINE CLINIC | Age: 30
End: 2025-07-02
Payer: COMMERCIAL

## 2025-07-02 VITALS
BODY MASS INDEX: 35.1 KG/M2 | WEIGHT: 205.6 LBS | TEMPERATURE: 97.4 F | OXYGEN SATURATION: 97 % | SYSTOLIC BLOOD PRESSURE: 122 MMHG | HEIGHT: 64 IN | HEART RATE: 86 BPM | DIASTOLIC BLOOD PRESSURE: 62 MMHG

## 2025-07-02 VITALS
OXYGEN SATURATION: 97 % | DIASTOLIC BLOOD PRESSURE: 62 MMHG | WEIGHT: 205.6 LBS | TEMPERATURE: 97.4 F | BODY MASS INDEX: 35.1 KG/M2 | HEIGHT: 64 IN | SYSTOLIC BLOOD PRESSURE: 122 MMHG | HEART RATE: 86 BPM

## 2025-07-02 DIAGNOSIS — N64.4 PAIN OF RIGHT BREAST: ICD-10-CM

## 2025-07-02 DIAGNOSIS — Z09 HOSPITAL DISCHARGE FOLLOW-UP: Primary | ICD-10-CM

## 2025-07-02 DIAGNOSIS — Z76.89 ENCOUNTER TO ESTABLISH CARE: Primary | ICD-10-CM

## 2025-07-02 DIAGNOSIS — N64.4 BREAST PAIN: ICD-10-CM

## 2025-07-02 PROCEDURE — 99213 OFFICE O/P EST LOW 20 MIN: CPT | Performed by: NURSE PRACTITIONER

## 2025-07-02 RX ORDER — CEPHALEXIN 500 MG/1
500 CAPSULE ORAL 4 TIMES DAILY
COMMUNITY

## 2025-07-02 SDOH — ECONOMIC STABILITY: FOOD INSECURITY: WITHIN THE PAST 12 MONTHS, YOU WORRIED THAT YOUR FOOD WOULD RUN OUT BEFORE YOU GOT MONEY TO BUY MORE.: SOMETIMES TRUE

## 2025-07-02 SDOH — ECONOMIC STABILITY: FOOD INSECURITY: WITHIN THE PAST 12 MONTHS, THE FOOD YOU BOUGHT JUST DIDN'T LAST AND YOU DIDN'T HAVE MONEY TO GET MORE.: SOMETIMES TRUE

## 2025-07-02 ASSESSMENT — ENCOUNTER SYMPTOMS
WHEEZING: 0
COUGH: 0
SHORTNESS OF BREATH: 0
CHEST TIGHTNESS: 0

## 2025-07-02 ASSESSMENT — PATIENT HEALTH QUESTIONNAIRE - PHQ9
SUM OF ALL RESPONSES TO PHQ QUESTIONS 1-9: 2
1. LITTLE INTEREST OR PLEASURE IN DOING THINGS: SEVERAL DAYS
2. FEELING DOWN, DEPRESSED OR HOPELESS: SEVERAL DAYS
SUM OF ALL RESPONSES TO PHQ QUESTIONS 1-9: 2

## 2025-07-02 NOTE — PROGRESS NOTES
Dean Engle  1995  29 y.o.    SUBJECT GT:    Chief Complaint   Patient presents with    Breast Pain     Right breast, nipple pain,  x2 months     Follow-up     Ed follow up [facility] / right breast pain       History of Present Illness  Dean is a 29 year old female who is in for an ED follow up. She speaks Czech Creole and her visit is conducted with an  via Ipad. She went to the ED for breast pain she had for 2 months.     She reported that she had breast fed for 3 months, baby stopped nursing. She stated breast was still coming out of her right breast and she was having pain. She stated prior to this she was not having pain.    She was started on antibiotics and advised to establish care with a PCP. She states since the treatment she has not had pain. She still has about 2 more days of antibiotic to complete. She denies fevers, chills or sweats.         Current Outpatient Medications on File Prior to Visit   Medication Sig Dispense Refill    cephALEXin (KEFLEX) 500 MG capsule Take 1 capsule by mouth 4 times daily      ibuprofen (ADVIL;MOTRIN) 800 MG tablet Take 1 tablet by mouth 2 times daily as needed for Pain (Patient not taking: Reported on 6/19/2025) 180 tablet 3    simethicone (MYLICON) 80 MG chewable tablet Take 1 tablet by mouth 4 times daily as needed for Flatulence (Patient not taking: Reported on 6/19/2025) 180 tablet 3    ferrous sulfate (IRON 325) 325 (65 Fe) MG tablet Take 1 tablet by mouth 2 times daily (Patient not taking: Reported on 6/19/2025) 60 tablet 0    docusate sodium (COLACE) 100 MG capsule Take 1 capsule by mouth 2 times daily as needed for Constipation (Patient not taking: Reported on 6/19/2025) 60 capsule 0    Prenatal Vit-Fe Sulfate-FA-DHA (PRENATAL VITAMIN/MIN +DHA) 27-0.8-200 MG CAPS Take 1 tablet by mouth daily (may substitute any prenatal vitamin covered by insurance, ok for prenatal without DHA if DHA based prenatal is not covered) (Patient not

## 2025-07-02 NOTE — PROGRESS NOTES
Dean Engle  1995  29 y.o.    SUBJECT GT:    Chief Complaint   Patient presents with    Establish Care    Breast Pain     Right breast, nipple pain,  x2 months        History of Present Illness  Dean is a 29 year old female who is in to establish care with a new PCP. She had been with [facility] for her pregnancy. She states she wishes to have a new PCP. She speaks Lithuanian Creole and her visit is conducted with an  via Ipad.    She gives a history of right breast pain that started about 2 months ago. She had previously nursed her baby for 3 months then milk stopped. She was seen in the ED for breast pain and started on antibiotics which is is almost complete at this time. She has no other significant past medical history.    Family medical history  She states her mother had an episode of having bleediing from breast. She is not sure what caused this but states her mother had 36 shots of something for treatment.       Current Outpatient Medications on File Prior to Visit   Medication Sig Dispense Refill    cephALEXin (KEFLEX) 500 MG capsule Take 1 capsule by mouth 4 times daily      ibuprofen (ADVIL;MOTRIN) 800 MG tablet Take 1 tablet by mouth 2 times daily as needed for Pain (Patient not taking: Reported on 6/19/2025) 180 tablet 3    simethicone (MYLICON) 80 MG chewable tablet Take 1 tablet by mouth 4 times daily as needed for Flatulence (Patient not taking: Reported on 6/19/2025) 180 tablet 3    ferrous sulfate (IRON 325) 325 (65 Fe) MG tablet Take 1 tablet by mouth 2 times daily (Patient not taking: Reported on 6/19/2025) 60 tablet 0    docusate sodium (COLACE) 100 MG capsule Take 1 capsule by mouth 2 times daily as needed for Constipation (Patient not taking: Reported on 6/19/2025) 60 capsule 0    Prenatal Vit-Fe Sulfate-FA-DHA (PRENATAL VITAMIN/MIN +DHA) 27-0.8-200 MG CAPS Take 1 tablet by mouth daily (may substitute any prenatal vitamin covered by insurance, ok for prenatal

## 2025-07-03 ASSESSMENT — ENCOUNTER SYMPTOMS
SHORTNESS OF BREATH: 0
GASTROINTESTINAL NEGATIVE: 1
COUGH: 0
CHEST TIGHTNESS: 0
WHEEZING: 0